# Patient Record
Sex: FEMALE | Race: WHITE | NOT HISPANIC OR LATINO | Employment: OTHER | URBAN - METROPOLITAN AREA
[De-identification: names, ages, dates, MRNs, and addresses within clinical notes are randomized per-mention and may not be internally consistent; named-entity substitution may affect disease eponyms.]

---

## 2017-01-03 ENCOUNTER — RECORDS - HEALTHEAST (OUTPATIENT)
Dept: GENERAL RADIOLOGY | Facility: CLINIC | Age: 57
End: 2017-01-03

## 2017-01-03 ENCOUNTER — OFFICE VISIT - HEALTHEAST (OUTPATIENT)
Dept: FAMILY MEDICINE | Facility: CLINIC | Age: 57
End: 2017-01-03

## 2017-01-03 DIAGNOSIS — R07.89 OTHER CHEST PAIN: ICD-10-CM

## 2017-01-03 DIAGNOSIS — M54.2 NECK PAIN: ICD-10-CM

## 2017-01-03 DIAGNOSIS — R07.89 CHEST WALL PAIN: ICD-10-CM

## 2017-01-27 ENCOUNTER — RECORDS - HEALTHEAST (OUTPATIENT)
Dept: ADMINISTRATIVE | Facility: OTHER | Age: 57
End: 2017-01-27

## 2017-12-19 ENCOUNTER — RECORDS - HEALTHEAST (OUTPATIENT)
Dept: ADMINISTRATIVE | Facility: OTHER | Age: 57
End: 2017-12-19

## 2018-02-08 ENCOUNTER — HOSPITAL ENCOUNTER (OUTPATIENT)
Dept: MAMMOGRAPHY | Facility: HOSPITAL | Age: 58
Discharge: HOME | End: 2018-02-08

## 2018-02-08 DIAGNOSIS — Z12.31 VISIT FOR SCREENING MAMMOGRAM: ICD-10-CM

## 2018-07-02 ENCOUNTER — COMMUNICATION - HEALTHEAST (OUTPATIENT)
Dept: FAMILY MEDICINE | Facility: CLINIC | Age: 58
End: 2018-07-02

## 2018-07-02 ENCOUNTER — OFFICE VISIT - HEALTHEAST (OUTPATIENT)
Dept: FAMILY MEDICINE | Facility: CLINIC | Age: 58
End: 2018-07-02

## 2018-07-02 DIAGNOSIS — R05.9 COUGH: ICD-10-CM

## 2018-07-02 DIAGNOSIS — Z12.11 SCREEN FOR COLON CANCER: ICD-10-CM

## 2018-07-02 ASSESSMENT — MIFFLIN-ST. JEOR: SCORE: 1614.89

## 2018-07-10 ENCOUNTER — COMMUNICATION - HEALTHEAST (OUTPATIENT)
Dept: SCHEDULING | Facility: CLINIC | Age: 58
End: 2018-07-10

## 2018-11-19 ENCOUNTER — RECORDS - HEALTHEAST (OUTPATIENT)
Dept: ADMINISTRATIVE | Facility: OTHER | Age: 58
End: 2018-11-19

## 2018-11-20 ENCOUNTER — RECORDS - HEALTHEAST (OUTPATIENT)
Dept: ADMINISTRATIVE | Facility: OTHER | Age: 58
End: 2018-11-20

## 2018-12-14 ENCOUNTER — OFFICE VISIT - HEALTHEAST (OUTPATIENT)
Dept: FAMILY MEDICINE | Facility: CLINIC | Age: 58
End: 2018-12-14

## 2018-12-14 ENCOUNTER — COMMUNICATION - HEALTHEAST (OUTPATIENT)
Dept: SCHEDULING | Facility: CLINIC | Age: 58
End: 2018-12-14

## 2018-12-14 DIAGNOSIS — Q63.9 KIDNEY ANOMALY, CONGENITAL: ICD-10-CM

## 2018-12-14 DIAGNOSIS — J01.00 ACUTE NON-RECURRENT MAXILLARY SINUSITIS: ICD-10-CM

## 2018-12-14 ASSESSMENT — MIFFLIN-ST. JEOR: SCORE: 1621.7

## 2019-08-07 ENCOUNTER — COMMUNICATION - HEALTHEAST (OUTPATIENT)
Dept: TELEHEALTH | Facility: CLINIC | Age: 59
End: 2019-08-07

## 2019-08-07 ENCOUNTER — COMMUNICATION - HEALTHEAST (OUTPATIENT)
Dept: FAMILY MEDICINE | Facility: CLINIC | Age: 59
End: 2019-08-07

## 2019-09-10 ENCOUNTER — COMMUNICATION - HEALTHEAST (OUTPATIENT)
Dept: FAMILY MEDICINE | Facility: CLINIC | Age: 59
End: 2019-09-10

## 2019-10-07 ENCOUNTER — HOSPITAL ENCOUNTER (OUTPATIENT)
Dept: MAMMOGRAPHY | Facility: CLINIC | Age: 59
Discharge: HOME | End: 2019-10-07
Attending: OBSTETRICS & GYNECOLOGY

## 2019-10-07 DIAGNOSIS — Z12.31 VISIT FOR SCREENING MAMMOGRAM: ICD-10-CM

## 2020-07-10 ENCOUNTER — COMMUNICATION - HEALTHEAST (OUTPATIENT)
Dept: FAMILY MEDICINE | Facility: CLINIC | Age: 60
End: 2020-07-10

## 2020-07-10 ENCOUNTER — OFFICE VISIT - HEALTHEAST (OUTPATIENT)
Dept: FAMILY MEDICINE | Facility: CLINIC | Age: 60
End: 2020-07-10

## 2020-07-10 ENCOUNTER — COMMUNICATION - HEALTHEAST (OUTPATIENT)
Dept: SCHEDULING | Facility: CLINIC | Age: 60
End: 2020-07-10

## 2020-07-10 DIAGNOSIS — R53.83 FATIGUE, UNSPECIFIED TYPE: ICD-10-CM

## 2020-07-10 DIAGNOSIS — R19.7 DIARRHEA, UNSPECIFIED TYPE: ICD-10-CM

## 2020-07-10 DIAGNOSIS — R05.9 COUGH: ICD-10-CM

## 2020-07-10 DIAGNOSIS — Z20.822 EXPOSURE TO COVID-19 VIRUS: ICD-10-CM

## 2020-07-10 DIAGNOSIS — M79.10 MYALGIA: ICD-10-CM

## 2020-07-11 ENCOUNTER — AMBULATORY - HEALTHEAST (OUTPATIENT)
Dept: FAMILY MEDICINE | Facility: CLINIC | Age: 60
End: 2020-07-11

## 2020-07-11 DIAGNOSIS — R05.9 COUGH: ICD-10-CM

## 2020-07-11 DIAGNOSIS — R19.7 DIARRHEA, UNSPECIFIED TYPE: ICD-10-CM

## 2020-07-11 DIAGNOSIS — Z20.822 EXPOSURE TO COVID-19 VIRUS: ICD-10-CM

## 2020-07-11 DIAGNOSIS — R53.83 FATIGUE, UNSPECIFIED TYPE: ICD-10-CM

## 2020-07-11 DIAGNOSIS — M79.10 MYALGIA: ICD-10-CM

## 2020-07-12 ENCOUNTER — COMMUNICATION - HEALTHEAST (OUTPATIENT)
Dept: LAB | Facility: CLINIC | Age: 60
End: 2020-07-12

## 2020-07-13 ENCOUNTER — COMMUNICATION - HEALTHEAST (OUTPATIENT)
Dept: FAMILY MEDICINE | Facility: CLINIC | Age: 60
End: 2020-07-13

## 2020-07-15 ENCOUNTER — COMMUNICATION - HEALTHEAST (OUTPATIENT)
Dept: FAMILY MEDICINE | Facility: CLINIC | Age: 60
End: 2020-07-15

## 2021-01-04 ENCOUNTER — HEALTH MAINTENANCE LETTER (OUTPATIENT)
Age: 61
End: 2021-01-04

## 2021-01-04 ENCOUNTER — HOSPITAL ENCOUNTER (OUTPATIENT)
Dept: MAMMOGRAPHY | Facility: CLINIC | Age: 61
Discharge: HOME | End: 2021-01-04
Attending: OBSTETRICS & GYNECOLOGY

## 2021-01-04 DIAGNOSIS — Z12.31 VISIT FOR SCREENING MAMMOGRAM: ICD-10-CM

## 2021-05-26 ENCOUNTER — RECORDS - HEALTHEAST (OUTPATIENT)
Dept: ADMINISTRATIVE | Facility: CLINIC | Age: 61
End: 2021-05-26

## 2021-05-27 ENCOUNTER — RECORDS - HEALTHEAST (OUTPATIENT)
Dept: ADMINISTRATIVE | Facility: CLINIC | Age: 61
End: 2021-05-27

## 2021-05-28 ENCOUNTER — RECORDS - HEALTHEAST (OUTPATIENT)
Dept: ADMINISTRATIVE | Facility: CLINIC | Age: 61
End: 2021-05-28

## 2021-05-29 ENCOUNTER — RECORDS - HEALTHEAST (OUTPATIENT)
Dept: ADMINISTRATIVE | Facility: CLINIC | Age: 61
End: 2021-05-29

## 2021-05-30 ENCOUNTER — RECORDS - HEALTHEAST (OUTPATIENT)
Dept: ADMINISTRATIVE | Facility: CLINIC | Age: 61
End: 2021-05-30

## 2021-05-30 VITALS — HEIGHT: 66 IN | BODY MASS INDEX: 34.7 KG/M2

## 2021-05-31 NOTE — TELEPHONE ENCOUNTER
Pt stopped into the clinic today with her mother.  She stated that her dad just passed away recently and her mother has moved up to [place] from [place].     Wondering if JM would take her mother on as a new pt. Moms name is   Rahat Hutson.     Thank you.

## 2021-06-01 VITALS — BODY MASS INDEX: 36.25 KG/M2 | HEIGHT: 66 IN | WEIGHT: 225.56 LBS

## 2021-06-02 VITALS — HEIGHT: 66 IN | WEIGHT: 227.06 LBS | BODY MASS INDEX: 36.49 KG/M2
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
## 2021-06-09 NOTE — TELEPHONE ENCOUNTER
She was exposed to her MD who tested positive for covid19 on Monday. She started with body aches, fatigue, nasal congestion yesterday. I connected with scheduling for a telephone appointment today @ 10:40 a.m. with Dr Muna Barajas to hopefully get an order for covid19 testing.  Yoselin Vega RN  [facility]      Reason for Disposition    [1] COVID-19 infection suspected by caller or triager AND [2] mild symptoms (cough, fever, or others) AND [3] no complications or SOB    Additional Information    Negative: SEVERE difficulty breathing (e.g., struggling for each breath, speaks in single words)    Negative: Difficult to awaken or acting confused (e.g., disoriented, slurred speech)    Negative: Bluish (or gray) lips or face now    Negative: Shock suspected (e.g., cold/pale/clammy skin, too weak to stand, low BP, rapid pulse)    Negative: Sounds like a life-threatening emergency to the triager    Negative: [1] COVID-19 exposure AND [2] no symptoms    Negative: COVID-19 and Breastfeeding, questions about    Negative: [1] Adult with possible COVID-19 symptoms AND [2] triager concerned about severity of symptoms or other causes    Negative: SEVERE or constant chest pain or pressure (Exception: mild central chest pain, present only when coughing)    Negative: MODERATE difficulty breathing (e.g., speaks in phrases, SOB even at rest, pulse 100-120)    Negative: Patient sounds very sick or weak to the triager    Negative: MILD difficulty breathing (e.g., minimal/no SOB at rest, SOB with walking, pulse <100)    Negative: Chest pain or pressure    Negative: Fever > 103 F (39.4 C)    Negative: [1] Fever > 101 F (38.3 C) AND [2] age > 60    Negative: [1] Fever > 100.0 F (37.8 C) AND [2] bedridden (e.g., nursing home patient, CVA, chronic illness, recovering from surgery)    Negative: HIGH RISK patient (e.g., age > 64 years, diabetes, heart or lung disease, weak immune system)    Negative: Fever present > 3 days (72  hours)    Negative: [1] Fever returns after gone for over 24 hours AND [2] symptoms worse or not improved    Negative: [1] Continuous (nonstop) coughing interferes with work or school AND [2] no improvement using cough treatment per protocol    Protocols used: CORONAVIRUS (COVID-19) DIAGNOSED OR [id number] 5.16.20

## 2021-06-09 NOTE — PROGRESS NOTES
"Inocencia Zafar is a 59 y.o. female who is being evaluated via a billable telephone visit.      The patient has been notified of following:     \"This telephone visit will be conducted via a call between you and your physician/provider. We have found that certain health care needs can be provided without the need for a physical exam.  This service lets us provide the care you need with a short phone conversation.  If a prescription is necessary we can send it directly to your pharmacy.  If lab work is needed we can place an order for that and you can then stop by our lab to have the test done at a later time.    Telephone visits are billed at different rates depending on your insurance coverage. During this emergency period, for some insurers they may be billed the same as an in-person visit.  Please reach out to your insurance provider with any questions.    If during the course of the call the physician/provider feels a telephone visit is not appropriate, you will not be charged for this service.\"    Patient has given verbal consent to a Telephone visit? Yes    What phone number would you like to be contacted at? [phone]    Patient would like to receive their AVS by AVS Preference: Mail a copy.    Chief Complaint   Patient presents with     General     onset 7/09/10, Pt was exposed to COVID by MD who tested positive for COVID on Monday)started with body aches, fatigue, nasal congestion, headache, temp 97.7( 7/10/10) starting yesterday. 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     Kristine got a call today that a doctor at the [facility] she had seen on Monday (today is Friday) tested positive for Covid19.  Both she and doctor had on a mask.  No one else in the office where the doctor worked tested positive.     AS of yesterday , maybe the evening before - woke up yesterday tired and achy.  She had not slept well the night before. Yesterday she was with her  all day at " [facility].  She was sore achy she had trouble walking at one point.  She also had a little coughing and sneezing yesterday, and today has a little headache and diarrhea    She denies fever, dyspnea, and loss of sense of smell    She lives with Elderly Mom, kids, ,. Mother has dementia and  has been with Kristine her the last year - just as of Wednesday they put her on an airplane to be with brother in [place].  She says very few people wee on the plane      Assessment/Plan:  1. Exposure to COVID-19 virus/ 2. Fatigue, unspecified type /3. Myalgia /4. Cough / 5. Diarrhea, unspecified type  Although this could be the combination of lack of sleep and another virus, given exposure we have to assume covid19 until proven otherwise.  Discussed self-isolation  - Symptomatic COVID-19 Virus (CORONAVIRUS) PCR; Future    I also ordered a test for her     Phone call duration: 30 minutes  10:55 - 11:25    Muna Barajas MD

## 2021-06-09 NOTE — PATIENT INSTRUCTIONS - HE
Instructions for Patients    Thank you for taking steps to prevent the spread of this virus.  o Limit your contact with others.  o Wear a simple mask to cover your cough.  o Wash your hands well and often.  o If you need medical care, go to [email or web address] or contact your health care provider.     For more about COVID-19 and caring for yourself at home, visit the CDC website at https://www.cdc.gov/coronavirus/2019-ncov/about/steps-when-sick.html.     To learn about care at [facility], please go to [email or web address].     Below are the COVID-19 hotlines at the [facility] ([facility]). Interpreters are available.     For health questions: Call [phone] or [phone] (7 a.m. to 7 p.m.)    For questions about schools and childcare: Call [phone] or [phone] (7 a.m. to 7 p.m.)

## 2021-06-09 NOTE — TELEPHONE ENCOUNTER
Left message to call back for: Patient  Information to relay to patient:  Please advise patient, Dr. Barajas CMA was trying to get in touch to go over some preliminary questions before pt telephone visit at 10:40 PM I will contact patient again closer to appt time.    BR, OMKAR

## 2021-06-09 NOTE — TELEPHONE ENCOUNTER
Spoke with pt and relayed results message below from Dr. Barajas.  Pt verbalized understanding and does not have any additional questions at this time.  Pt states that she is feeling a lot better and will call back if her symptoms return.

## 2021-06-09 NOTE — TELEPHONE ENCOUNTER
----- Message from Muna Barajas MD sent at 7/13/2020  8:12 AM CDT -----  Please call her and let her know Covid19 test is negative.  However if symptoms persist of worsen, she should consider retest.

## 2021-06-15 PROBLEM — R07.89 CHEST WALL PAIN: Status: ACTIVE | Noted: 2017-01-03

## 2021-06-19 NOTE — PROGRESS NOTES
Assessment/Plan:    Inocencia Zafar is a 57 y.o. female presenting for:    1. Screen for colon cancer  - Ambulatory referral for Colonoscopy    2. Cough  I personally reviewed the chest x-ray and find it to be negative for any pneumonia.  Patient does have wheezing on examination.  Although she does not have asthma I do believe she would benefit from an albuterol inhaler and a course of prednisone.  This was sent to the pharmacy.  Discussed signs and symptoms that would necessitate further follow-up.  Certainly if she begins to have fevers we would want to see her again.  I suspect that this is more irritation from allergens.  - XR Chest 2 Views  - predniSONE (DELTASONE) 20 MG tablet; Take 1 tablet (20 mg total) by mouth 2 (two) times a day for 5 days.  Dispense: 10 tablet; Refill: 0  - albuterol (PROAIR HFA;PROVENTIL HFA;VENTOLIN HFA) 90 mcg/actuation inhaler; Inhale 2 puffs every 6 (six) hours as needed for wheezing.  Dispense: 1 each; Refill: 0        There are no discontinued medications.        Chief Complaint:  Chief Complaint   Patient presents with     Nasal Congestion     Chest congestion- sxs x 3 days- seasonal allergies     Headache     Generalized Body Aches       Subjective:   Inocencia Zafar is a pleasant 57-year-old female with a past medical history significant for hypothyroidism presenting to the clinic today with concerns over chest congestion and shortness of breath.  Patient states that about 3 or 4 days ago she was sitting on her neighbors deck when there is a lot of dust and allergens.  She states it is that time she has been experiencing some shortness of breath and coughing.  She has had some nasal and chest congestion as well.  She has been taking Sudafed and Mucinex.  Tylenol on occasion.  She does not have a history of asthma nor does she have a history of wheezing.    She has been eating and drinking a bit less per her report but staying adequately hydrated.    12 point review of  "systems completed and negative except for what has been described above    History   Smoking Status     Never Smoker   Smokeless Tobacco     Never Used       Current Outpatient Prescriptions   Medication Sig Note     cholecalciferol, vitamin D3, (VITAMIN D3) 1,000 unit capsule Take by mouth daily. 2 capsules daily      coenzyme Q10 (CO Q-10) 100 mg capsule Take by mouth daily.      CYANOCOBALAMIN, VITAMIN B-12, (VITAMIN B-12 ORAL) Take by mouth.      levothyroxine (SYNTHROID, LEVOTHROID) 175 MCG tablet daily. Takes branded Synthroid 11/29/2016: Received from: [facility] Received Sig: Take 1 pill daily 6 days per week.  1/2 pill daily on one day a week.     MULTIVIT &MINERALS/FERROUS FUM (MULTI VITAMIN ORAL) Take by mouth daily.      OMEGA-3S/DHA/EPA/FISH OIL (OMEGA 3 ORAL) Take by mouth.      albuterol (PROAIR HFA;PROVENTIL HFA;VENTOLIN HFA) 90 mcg/actuation inhaler Inhale 2 puffs every 6 (six) hours as needed for wheezing.      predniSONE (DELTASONE) 20 MG tablet Take 1 tablet (20 mg total) by mouth 2 (two) times a day for 5 days.          Objective:  Vitals:    07/02/18 1357   BP: 122/88   Pulse: 94   Resp: 20   Temp: 98.8  F (37.1  C)   TempSrc: Oral   SpO2: 97%   Weight: (!) 225 lb 9 oz (102.3 kg)   Height: 5' 6\" (1.676 m)       Vital signs reviewed and stable  General: No acute distress  Psych: Appropriate affect  HEENT: moist mucous membranes, pupils equal, round, reactive to light and accomodation, posterior oropharynx clear of erythema or exudate, tympanic membranes are pearly grey bilaterally  Lymph: no cervical or supraclavicular lymphadenopathy  Cardiovascular: regular rate and rhythm with no murmur  Pulmonary: Diffuse end inspiratory and end expiratory wheezes bilaterally.  No rhonchi or crackles noted.  Abdomen: soft, non tender, non distended with normo-active bowel sounds  Extremities: warm and well perfused with no edema  Skin: warm and dry with no rash         This " note has been dictated and transcribed using voice recognition software.   Any errors in transcription are unintentional and inherent to the software.

## 2021-06-19 NOTE — LETTER
Letter by Hardeep Sharp MD at      Author: Hardeep Sharp MD Service: -- Author Type: --    Filed:  Encounter Date: 9/10/2019 Status: (Other)       Inocencia MICHAEL Zafar  [address] [address]    September 10, 2019    Dear Inocencia    In reviewing your records, we have determined a gap in your preventive services. Based on your age and health history, we recommend the follow service.     ? General Physical  ? Physical with a Pap Smear  ? Colon cancer screening  ? Mammogram  ? Immunization  ? Diabetic check  ? Blood pressure/cardiovascular check  ? Asthma check  ? Cholesterol test  ? Lab work  ? Med check    If you have had the service elsewhere, please contact us so we can update our records. Please let us know if you have transferred your care to another clinic.    Please call [phone] to schedule this appointment.    We believe that a strong preventive care program, including regular physicals and follow-up care is an important part of a healthy lifestyle and we are committed to helping you maintain your health.    Thank you for choosing us as your health care provider.    Sincerely,   [facility]  [address] [facility] [facility] [address]  Phone Number:  [phone]

## 2021-06-20 NOTE — LETTER
Letter by Judit Khoury RN at      Author: Judit Khoury RN Service: -- Author Type: --    Filed:  Encounter Date: 7/12/2020 Status: (Other)       7/12/2020        Inocencia Zafar  [address] [address]    This letter provides a written record that you were tested for COVID-19 on 7/11/20.     Your result was negative. This means that we didnt find the virus that causes COVID-19 in your sample. A test may show negative when you do actually have the virus. This can happen when the virus is in the early stages of infection, before you feel illness symptoms.    If you have symptoms   Stay home and away from others (self-isolate) until you meet ALL of the guidelines below:    Youve had no fever--and no medicine that reduces fever--for 3 full days (72 hours). And ?    Your other symptoms have gotten better. For example, your cough or breathing has improved. And?    At least 10 days have passed since your symptoms started.    During this time:    Stay home. Dont go to work, school or anywhere else.     Stay in your own room, including for meals. Use your own bathroom if you can.    Stay away from others in your home. No hugging, kissing or shaking hands. No visitors.    Clean high touch surfaces often (doorknobs, counters, handles, etc.). Use a household cleaning spray or wipes. You can find a full list on the EPA website at www.epa.gov/pesticide-registration/list-n-disinfectants-use-against-sars-cov-2.    Cover your mouth and nose with a mask, tissue or washcloth to avoid spreading germs.    Wash your hands and face often with soap and water.    Going back to work  Check with your employer for any guidelines to follow for going back to work.    Employers: This document serves as formal notice that your employee tested negative for COVID-19, as of the testing date shown above.

## 2021-06-20 NOTE — LETTER
Letter by Ashanti Edwards RN at      Author: Ashanti Edwards RN Service: -- Author Type: --    Filed:  Encounter Date: 7/15/2020 Status: (Other)       7/15/2020        Inocencia Zafar  [address] [address]    This letter provides a written record that you were tested for COVID-19 on 7/11/2020.     Your result was negative. This means that we didnt find the virus that causes COVID-19 in your sample. A test may show negative when you do actually have the virus. This can happen when the virus is in the early stages of infection, before you feel illness symptoms.    If you have symptoms   Stay home and away from others (self-isolate) until you meet ALL of the guidelines below:    Youve had no fever--and no medicine that reduces fever--for 3 full days (72 hours). And ?    Your other symptoms have gotten better. For example, your cough or breathing has improved. And?    At least 10 days have passed since your symptoms started.    During this time:    Stay home. Dont go to work, school or anywhere else.     Stay in your own room, including for meals. Use your own bathroom if you can.    Stay away from others in your home. No hugging, kissing or shaking hands. No visitors.    Clean high touch surfaces often (doorknobs, counters, handles, etc.). Use a household cleaning spray or wipes. You can find a full list on the EPA website at www.epa.gov/pesticide-registration/list-n-disinfectants-use-against-sars-cov-2.    Cover your mouth and nose with a mask, tissue or washcloth to avoid spreading germs.    Wash your hands and face often with soap and water.    Going back to work  Check with your employer for any guidelines to follow for going back to work.    Employers: This document serves as formal notice that your employee tested negative for COVID-19, as of the testing date shown above.

## 2021-06-22 NOTE — PROGRESS NOTES
Assessment/Plan:    Inocencia Zafar is a 58 y.o. female presenting for:    1. Acute non-recurrent maxillary sinusitis   the patient has symptoms for approximately 5 days at this point. I discussed with the patient but generally we do not treat sinus infections until daytime given that they are almost always viral infections. I did give her a prescription for Augmentin however discussed that I would prefer if she try some symptomatic measures we discussed and only take the  Antibiotic next week if necessary.    2. Kidney anomaly, congenital  Because the patient states that the anomaly is most likely due to an anomaly in her urinary tract I will send a referral to urology for evaluation. I did discuss it there is likely not much that they could do but it would be reasonable for her to see a specialist to see what kind of monitoring, if any, would be warranted  - Ambulatory referral to Urology        There are no discontinued medications.        Chief Complaint:  Chief Complaint   Patient presents with     Sinusitis     Sxs x 3 days- getting worse- headache, pressure, mucus, drainage, ear pain     Referral     Urologist       Subjective:   Inocencia Zafar  It is a pleasant 58-year-old female listening to the clinic today or two concerns:     1. Sinusitis: patient notes of the last five  Days she has had intense pressure in her face and  Yellow/green nasal drainage. She states that she gets a sinus infection once yearly. She feels as though it is getting worse. She has used a steam treatment to her face which is not seem to help. She has also tried ibuprofen, Sudafed and mucinex.   She has not had fevers. She is eating and drinking normally a report.     2.  Congenital single kidney: patient notes that she was found to have a single kidney that was functioning. It was thought that this was likely due to a abnormality in the urinary tract. This was diagnosed about 10 years ago and she states that she was supposed  "to be following up with urology but she has not seen them for at least eight years. She would like a referral at this point. She has no issues with urination. Recent creatinine was normal.        12 point review of systems completed and negative except for what has been described above    Social History     Tobacco Use   Smoking Status Never Smoker   Smokeless Tobacco Never Used       Current Outpatient Medications   Medication Sig Note     albuterol (PROAIR HFA;PROVENTIL HFA;VENTOLIN HFA) 90 mcg/actuation inhaler Inhale 2 puffs every 6 (six) hours as needed for wheezing.      cholecalciferol, vitamin D3, (VITAMIN D3) 1,000 unit capsule Take by mouth daily. 2 capsules daily      coenzyme Q10 (CO Q-10) 100 mg capsule Take by mouth daily.      CYANOCOBALAMIN, VITAMIN B-12, (VITAMIN B-12 ORAL) Take by mouth.      levothyroxine (SYNTHROID, LEVOTHROID) 175 MCG tablet daily. Takes branded Synthroid 11/29/2016: Received from: [facility] Received Sig: Take 1 pill daily 6 days per week.  1/2 pill daily on one day a week.     MULTIVIT &MINERALS/FERROUS FUM (MULTI VITAMIN ORAL) Take by mouth daily.      OMEGA-3S/DHA/EPA/FISH OIL (OMEGA 3 ORAL) Take by mouth.      amoxicillin-clavulanate (AUGMENTIN) 875-125 mg per tablet Take 1 tablet by mouth 2 (two) times a day for 10 days.          Objective:  Vitals:    12/14/18 1404   BP: 126/89   Pulse: 79   Resp: 16   Temp: 97.5  F (36.4  C)   TempSrc: Oral   Weight: (!) 227 lb 1 oz (103 kg)   Height: 5' 6\" (1.676 m)       Body mass index is 36.65 kg/m .    Vital signs reviewed and stable  General: No acute distress  Psych: Appropriate affect  HEENT: moist mucous membranes, pupils equal, round, reactive to light and accomodation, posterior oropharynx clear of erythema or exudate, tympanic membranes are pearly grey bilaterally  Lymph: no cervical or supraclavicular lymphadenopathy  Cardiovascular: regular rate and rhythm with no murmur  Pulmonary: clear " to auscultation bilaterally with no wheeze  Abdomen: soft, non tender, non distended with normo-active bowel sounds  Extremities: warm and well perfused with no edema  Skin: warm and dry with no rash         This note has been dictated and transcribed using voice recognition software.   Any errors in transcription are unintentional and inherent to the software.

## 2021-07-13 ENCOUNTER — RECORDS - HEALTHEAST (OUTPATIENT)
Dept: ADMINISTRATIVE | Facility: CLINIC | Age: 61
End: 2021-07-13

## 2021-07-21 ENCOUNTER — RECORDS - HEALTHEAST (OUTPATIENT)
Dept: ADMINISTRATIVE | Facility: CLINIC | Age: 61
End: 2021-07-21

## 2021-10-11 ENCOUNTER — HEALTH MAINTENANCE LETTER (OUTPATIENT)
Age: 61
End: 2021-10-11

## 2022-01-30 ENCOUNTER — HEALTH MAINTENANCE LETTER (OUTPATIENT)
Age: 62
End: 2022-01-30

## 2022-04-08 ENCOUNTER — ANCILLARY PROCEDURE (OUTPATIENT)
Dept: MAMMOGRAPHY | Facility: CLINIC | Age: 62
End: 2022-04-08
Attending: FAMILY MEDICINE
Payer: COMMERCIAL

## 2022-04-08 DIAGNOSIS — Z12.31 SCREENING MAMMOGRAM, ENCOUNTER FOR: ICD-10-CM

## 2022-04-08 PROCEDURE — 77067 SCR MAMMO BI INCL CAD: CPT

## 2022-09-24 ENCOUNTER — HEALTH MAINTENANCE LETTER (OUTPATIENT)
Age: 62
End: 2022-09-24

## 2022-10-12 ENCOUNTER — NURSE TRIAGE (OUTPATIENT)
Dept: NURSING | Facility: CLINIC | Age: 62
End: 2022-10-12

## 2022-10-12 ENCOUNTER — OFFICE VISIT (OUTPATIENT)
Dept: INTERNAL MEDICINE | Facility: CLINIC | Age: 62
End: 2022-10-12
Payer: COMMERCIAL

## 2022-10-12 VITALS
OXYGEN SATURATION: 98 % | BODY MASS INDEX: 34.38 KG/M2 | DIASTOLIC BLOOD PRESSURE: 80 MMHG | HEART RATE: 82 BPM | TEMPERATURE: 98.1 F | WEIGHT: 213 LBS | SYSTOLIC BLOOD PRESSURE: 134 MMHG

## 2022-10-12 DIAGNOSIS — J01.90 ACUTE SINUSITIS, RECURRENCE NOT SPECIFIED, UNSPECIFIED LOCATION: Primary | ICD-10-CM

## 2022-10-12 PROCEDURE — 99213 OFFICE O/P EST LOW 20 MIN: CPT | Performed by: NURSE PRACTITIONER

## 2022-10-12 RX ORDER — AMOXICILLIN 875 MG
875 TABLET ORAL 2 TIMES DAILY
Qty: 14 TABLET | Refills: 0 | Status: SHIPPED | OUTPATIENT
Start: 2022-10-12 | End: 2023-06-06

## 2022-10-12 RX ORDER — PREDNISONE 20 MG/1
20 TABLET ORAL DAILY
Qty: 5 TABLET | Refills: 0 | Status: SHIPPED | OUTPATIENT
Start: 2022-10-12 | End: 2023-06-06

## 2022-10-12 NOTE — TELEPHONE ENCOUNTER
Patient is calling and states that she has allergies that has gone down to her chest.  Now congestion in chest.  Cough is present, nasal drainage, coughing up yellow mucus.  Patient has tried antihistamines and decongestant.  Symptoms started three days ago.  Patient denies fever and denies shortness of breath.  Patient states that she has allergies and feels it is a mixture of allergies and sinusitis.  Patient denies severe headache.  Denies redness or swelling on the cheek, forehead, or around the eye.  Patient states that she is going to [facility].      Reason for Disposition    Patient wants to be seen    Additional Information    Negative: Sounds like a life-threatening emergency to the triager    Negative: Difficulty breathing, and not from stuffy nose (e.g., not relieved by cleaning out the nose)    Negative: SEVERE headache and has fever    Negative: Patient sounds very sick or weak to the triager    Negative: SEVERE sinus pain    Negative: Severe headache    Negative: Redness or swelling on the cheek, forehead, or around the eye    Negative: Fever > 103 F (39.4 C)    Negative: Fever > 101 F (38.3 C) and over 60 years of age    Negative: Fever > 100.0 F (37.8 C) and bedridden (e.g., nursing home patient, stroke, chronic illness, recovering from surgery)    Negative: Fever > 100.0 F (37.8 C) and has diabetes mellitus or a weak immune system (e.g., HIV positive, cancer chemotherapy, organ transplant, splenectomy, chronic steroids)    Negative: Fever present > 3 days (72 hours)    Negative: Fever returns after gone for over 24 hours and symptoms worse or not improved    Negative: Sinus pain (not just congestion) and fever    Negative: Earache    Negative: Sinus congestion (pressure, fullness) present > 10 days    Negative: Nasal discharge present > 10 days    Negative: Using nasal washes and pain medicine > 24 hours and sinus pain (lower forehead, cheekbone, or eye) persists    Negative: Lots of  coughing    Protocols used: SINUS PAIN OR CONGESTION-A-OH

## 2023-05-08 ENCOUNTER — HEALTH MAINTENANCE LETTER (OUTPATIENT)
Age: 63
End: 2023-05-08

## 2023-06-06 ENCOUNTER — TELEPHONE (OUTPATIENT)
Dept: FAMILY MEDICINE | Facility: CLINIC | Age: 63
End: 2023-06-06

## 2023-06-06 ENCOUNTER — OFFICE VISIT (OUTPATIENT)
Dept: FAMILY MEDICINE | Facility: CLINIC | Age: 63
End: 2023-06-06
Payer: COMMERCIAL

## 2023-06-06 VITALS
WEIGHT: 219 LBS | HEART RATE: 66 BPM | SYSTOLIC BLOOD PRESSURE: 129 MMHG | RESPIRATION RATE: 16 BRPM | OXYGEN SATURATION: 98 % | BODY MASS INDEX: 35.2 KG/M2 | TEMPERATURE: 97.9 F | DIASTOLIC BLOOD PRESSURE: 88 MMHG | HEIGHT: 66 IN

## 2023-06-06 DIAGNOSIS — Z12.4 CERVICAL CANCER SCREENING: ICD-10-CM

## 2023-06-06 DIAGNOSIS — Z00.00 ROUTINE PHYSICAL EXAMINATION: Primary | ICD-10-CM

## 2023-06-06 DIAGNOSIS — L85.3 DRY SKIN: ICD-10-CM

## 2023-06-06 DIAGNOSIS — H40.9 GLAUCOMA, UNSPECIFIED GLAUCOMA TYPE, UNSPECIFIED LATERALITY: ICD-10-CM

## 2023-06-06 DIAGNOSIS — E78.00 PURE HYPERCHOLESTEROLEMIA: ICD-10-CM

## 2023-06-06 DIAGNOSIS — E03.9 HYPOTHYROIDISM, UNSPECIFIED TYPE: ICD-10-CM

## 2023-06-06 LAB
ALBUMIN UR-MCNC: 100 MG/DL
APPEARANCE UR: CLEAR
BACTERIA #/AREA URNS HPF: ABNORMAL /HPF
BILIRUB UR QL STRIP: NEGATIVE
COLOR UR AUTO: YELLOW
GLUCOSE UR STRIP-MCNC: NEGATIVE MG/DL
HBA1C MFR BLD: 5.7 % (ref 0–5.6)
HGB UR QL STRIP: NEGATIVE
KETONES UR STRIP-MCNC: NEGATIVE MG/DL
LEUKOCYTE ESTERASE UR QL STRIP: NEGATIVE
NITRATE UR QL: NEGATIVE
PH UR STRIP: 5.5 [PH] (ref 5–8)
RBC #/AREA URNS AUTO: ABNORMAL /HPF
SP GR UR STRIP: 1.01 (ref 1–1.03)
SQUAMOUS #/AREA URNS AUTO: ABNORMAL /LPF
UROBILINOGEN UR STRIP-ACNC: 0.2 E.U./DL
WBC #/AREA URNS AUTO: ABNORMAL /HPF

## 2023-06-06 PROCEDURE — 36415 COLL VENOUS BLD VENIPUNCTURE: CPT | Performed by: FAMILY MEDICINE

## 2023-06-06 PROCEDURE — 99396 PREV VISIT EST AGE 40-64: CPT | Performed by: FAMILY MEDICINE

## 2023-06-06 PROCEDURE — 99213 OFFICE O/P EST LOW 20 MIN: CPT | Mod: 25 | Performed by: FAMILY MEDICINE

## 2023-06-06 PROCEDURE — 81001 URINALYSIS AUTO W/SCOPE: CPT | Performed by: FAMILY MEDICINE

## 2023-06-06 PROCEDURE — 83036 HEMOGLOBIN GLYCOSYLATED A1C: CPT | Performed by: FAMILY MEDICINE

## 2023-06-06 PROCEDURE — 80048 BASIC METABOLIC PNL TOTAL CA: CPT | Performed by: FAMILY MEDICINE

## 2023-06-06 RX ORDER — AMMONIUM LACTATE 12 G/100G
CREAM TOPICAL
Qty: 385 G | Refills: 1 | Status: SHIPPED | OUTPATIENT
Start: 2023-06-06

## 2023-06-06 RX ORDER — BETAMETHASONE DIPROPIONATE 0.5 MG/G
LOTION TOPICAL
Qty: 60 ML | Refills: 1 | Status: SHIPPED | OUTPATIENT
Start: 2023-06-06 | End: 2024-09-25

## 2023-06-06 RX ORDER — BETAMETHASONE DIPROPIONATE 0.5 MG/G
CREAM TOPICAL
Qty: 45 G | Refills: 1 | Status: SHIPPED | OUTPATIENT
Start: 2023-06-06 | End: 2023-08-02

## 2023-06-06 RX ORDER — VALACYCLOVIR HYDROCHLORIDE 1 G/1
1000 TABLET, FILM COATED ORAL 2 TIMES DAILY
COMMUNITY
Start: 2022-07-26 | End: 2023-06-06

## 2023-06-06 RX ORDER — LATANOPROST 50 UG/ML
SOLUTION/ DROPS OPHTHALMIC
COMMUNITY
Start: 2023-04-10

## 2023-06-06 RX ORDER — AMMONIUM LACTATE 12 G/100G
CREAM TOPICAL 2 TIMES DAILY
Qty: 385 G | Refills: 1 | Status: SHIPPED | OUTPATIENT
Start: 2023-06-06 | End: 2023-06-06

## 2023-06-06 RX ORDER — BETAMETHASONE DIPROPIONATE 0.5 MG/G
LOTION TOPICAL
Qty: 60 ML | Refills: 1 | Status: SHIPPED | OUTPATIENT
Start: 2023-06-06 | End: 2023-06-06

## 2023-06-06 RX ORDER — TRIAMCINOLONE ACETONIDE 1 MG/G
CREAM TOPICAL
COMMUNITY
Start: 2022-07-20

## 2023-06-06 ASSESSMENT — ENCOUNTER SYMPTOMS
HEMATURIA: 0
FREQUENCY: 0
ARTHRALGIAS: 0
DYSURIA: 0
HEMATOCHEZIA: 0
SORE THROAT: 0
CONSTIPATION: 0
ABDOMINAL PAIN: 0
WEAKNESS: 0
HEADACHES: 0
BREAST MASS: 0
HEARTBURN: 0
SHORTNESS OF BREATH: 0
NAUSEA: 0
JOINT SWELLING: 0
DIZZINESS: 0
PARESTHESIAS: 0
FEVER: 0
PALPITATIONS: 0
COUGH: 0
NERVOUS/ANXIOUS: 0
MYALGIAS: 0
EYE PAIN: 0
CHILLS: 0
DIARRHEA: 0

## 2023-06-06 NOTE — TELEPHONE ENCOUNTER
[facility] pharmacy is calling asking is there a specific number of times per day as needed for the betamethasone dipropionate 0.05% external lotion and for the ammonium lactate 12% external cream they need either a # of days supply or areas of application in order to estimate days supply for billing purposes. Please advise.

## 2023-06-06 NOTE — PATIENT INSTRUCTIONS
Inocencia,    We will check your kidney profile, urine test, and blood sugar testing today  You can reach out to Dr. Pryor regarding Ozempic  Dr. Jazmine Bailey is certified in weight loss management.  You may make an intake appointment with her  I sent refills of your steroid creams and lotions as requested  Follow-up to update your Pap test  Your colonoscopy is due later this year  You may set up a mammogram  You can also consider the Shingrix/shingles vaccine    Hardeep Sharp MD

## 2023-06-06 NOTE — PROGRESS NOTES
SUBJECTIVE:   CC: Inocencia is an 62 year old who presents for preventive health visit.     Inocencia is a pleasant 62-year-old female who presents to clinic for a preventive examination.    Medical history is notable for hypercholesterolemia, vitamin D deficiency, and hypothyroidism.  She follows up with ophthalmology given history of glaucoma.    In the past her total cholesterol was 261 with an LDL of 175.    She had a colonoscopy in November 2018 showing an adenomatous colon polyp.  She will be due in November 2023.    She has a history of dry skin and dry patches secondary to eczema.  She would like a refill of her steroid creams that she requires to treat this.    She does have some concern regarding her weight as well which has increased over time.  She has lost weight from a high of 239 pounds but struggles with losing weight.    She does follow-up with endocrinology given her history of hypothyroidism.          6/6/2023     3:33 PM   Additional Questions   Roomed by Svetlana ROBERTO CMA     Healthy Habits:     Getting at least 3 servings of Calcium per day:  Yes    Bi-annual eye exam:  Yes    Dental care twice a year:  Yes    Sleep apnea or symptoms of sleep apnea:  None    Diet:  Other    Frequency of exercise:  4-5 days/week    Duration of exercise:  30-45 minutes    Taking medications regularly:  Yes    Medication side effects:  None    PHQ-2 Total Score: 0    Additional concerns today:  No            Today's PHQ-2 Score:       6/6/2023     3:26 PM   PHQ-2 ( 1999 [employer])   Q1: Little interest or pleasure in doing things 0   Q2: Feeling down, depressed or hopeless 0   PHQ-2 Score 0   Q1: Little interest or pleasure in doing things Not at all   Q2: Feeling down, depressed or hopeless Not at all   PHQ-2 Score 0       Have you ever done Advance Care Planning? (For example, a Health Directive, POLST, or a discussion with a medical provider or your loved ones about your wishes): Yes, patient states has an Advance Care  Planning document and will bring a copy to the clinic.    Social History     Tobacco Use     Smoking status: Never     Smokeless tobacco: Never   Vaping Use     Vaping status: Never Used   Substance Use Topics     Alcohol use: Not on file             6/6/2023     3:26 PM   Alcohol Use   Prescreen: >3 drinks/day or >7 drinks/week? No          View : No data to display.              Reviewed orders with patient.  Reviewed health maintenance and updated orders accordingly - Yes  Patient Active Problem List   Diagnosis     Obesity     Hypothyroidism     Vitamin D Deficiency     Essential Hypercholesterolemia     Lower Back Pain     Plantar Fasciitis     Chest wall pain     Past Surgical History:   Procedure Laterality Date     HYSTEROSCOPY, ABLATE ENDOMETRIUM HYDROTHERMAL, COMBINED      Description: Hysteroscopy With Endometrial Ablation;  Recorded: 01/26/2012;     LEFT OOPHORECTOMY Left      OTHER SURGICAL HISTORY      tonsllectomy     SALPINGECTOMY Left        Social History     Tobacco Use     Smoking status: Never     Smokeless tobacco: Never   Vaping Use     Vaping status: Never Used   Substance Use Topics     Alcohol use: Not on file     Family History   Problem Relation Age of Onset     Colon Cancer Mother      Dementia Mother      Colon Cancer Maternal Grandmother      Breast Cancer No family hx of          Current Outpatient Medications   Medication Sig Dispense Refill     ammonium lactate (AMLACTIN) 12 % external cream Apply topically to affected area of trunk and extremities BID prn 385 g 1     betamethasone dipropionate (DIPROSONE) 0.05 % external cream Apply to affected area BID prn 45 g 1     betamethasone dipropionate (DIPROSONE) 0.05 % external lotion Apply to affected area of scalp Qday prn 60 mL 1     cholecalciferol, vitamin D3, (VITAMIN D3) 1,000 unit capsule [CHOLECALCIFEROL, VITAMIN D3, (VITAMIN D3) 1,000 UNIT CAPSULE] Take by mouth daily. 2 capsules daily       latanoprost (XALATAN) 0.005 %  ophthalmic solution Apply 1 drop INTO Both Eyes once a daY       levothyroxine (SYNTHROID, LEVOTHROID) 175 MCG tablet [LEVOTHYROXINE (SYNTHROID, LEVOTHROID) 175 MCG TABLET] daily. Takes branded Synthroid       MULTIVIT &MINERALS/FERROUS FUM (MULTI VITAMIN ORAL) [MULTIVIT &MINERALS/FERROUS FUM (MULTI VITAMIN ORAL)] Take by mouth daily.       triamcinolone (KENALOG) 0.1 % external cream MIX 11 WITH KETOCONAZOLE.APPLY TO AFFECTED AREA TWICE DAILY FOR 2 WEEKS.       No Known Allergies    Breast Cancer Screening:    FHS-7:       4/8/2022     2:12 PM 6/6/2023     3:27 PM   Breast CA Risk Assessment (FHS-7)   Did any of your first-degree relatives have breast or ovarian cancer? No No   Did any of your relatives have bilateral breast cancer? No No   Did any man in your family have breast cancer? No No   Did any woman in your family have breast and ovarian cancer? No No   Did any woman in your family have breast cancer before age 50 y? No No   Do you have 2 or more relatives with breast and/or ovarian cancer? No No   Do you have 2 or more relatives with breast and/or bowel cancer? Yes No     click delete button to remove this line now  Mammogram Screening: Recommended mammography every 1-2 years with patient discussion and risk factor consideration  Pertinent mammograms are reviewed under the imaging tab.    History of abnormal Pap smear: NO - age 30-65 PAP every 5 years with negative HPV co-testing recommended     Reviewed and updated as needed this visit by clinical staff   Tobacco  Allergies  Meds              Reviewed and updated as needed this visit by Provider                 History reviewed. No pertinent past medical history.   Past Surgical History:   Procedure Laterality Date     HYSTEROSCOPY, ABLATE ENDOMETRIUM HYDROTHERMAL, COMBINED      Description: Hysteroscopy With Endometrial Ablation;  Recorded: 01/26/2012;     LEFT OOPHORECTOMY Left      OTHER SURGICAL HISTORY      tonsllectomy     SALPINGECTOMY Left   "      Review of Systems   Constitutional: Negative for chills and fever.   HENT: Negative for congestion, ear pain, hearing loss and sore throat.    Eyes: Negative for pain and visual disturbance.   Respiratory: Negative for cough and shortness of breath.    Cardiovascular: Negative for chest pain, palpitations and peripheral edema.   Gastrointestinal: Negative for abdominal pain, constipation, diarrhea, heartburn, hematochezia and nausea.   Breasts:  Negative for tenderness, breast mass and discharge.   Genitourinary: Negative for dysuria, frequency, genital sores, hematuria, pelvic pain, urgency, vaginal bleeding and vaginal discharge.   Musculoskeletal: Negative for arthralgias, joint swelling and myalgias.   Skin: Negative for rash.   Neurological: Negative for dizziness, weakness, headaches and paresthesias.   Psychiatric/Behavioral: Negative for mood changes. The patient is not nervous/anxious.      CONSTITUTIONAL: NEGATIVE for fever, chills, change in weight  INTEGUMENTARY/SKIN: NEGATIVE for worrisome rashes, moles or lesions  EYES: NEGATIVE for vision changes or irritation  ENT: NEGATIVE for ear, mouth and throat problems  RESP: NEGATIVE for significant cough or SOB  CV: NEGATIVE for chest pain, palpitations or peripheral edema  GI: NEGATIVE for nausea, abdominal pain, heartburn, or change in bowel habits  : NEGATIVE for unusual urinary or vaginal symptoms. No vaginal bleeding.  MUSCULOSKELETAL: NEGATIVE for significant arthralgias or myalgia  NEURO: NEGATIVE for weakness, dizziness or paresthesias  PSYCHIATRIC: NEGATIVE for changes in mood or affect      OBJECTIVE:   /88 (BP Location: Left arm, Patient Position: Sitting, Cuff Size: Adult Large)   Pulse 66   Temp 97.9  F (36.6  C) (Oral)   Resp 16   Ht 1.676 m (5' 6\")   Wt 99.3 kg (219 lb)   LMP  (LMP Unknown)   SpO2 98%   BMI 35.35 kg/m    Physical Exam      Diagnostic Test Results:  Labs reviewed in Epic    ASSESSMENT/PLAN:   Inocencia was " seen today for physical.    Diagnoses and all orders for this visit:    Routine physical examination    Recommend mammogram  Colonoscopy is up-to-date from August 2018.  Given a history of an adenomatous polyp she is due in November of 2023    Recommend increasing physical activity as she is able  Continue work on weight loss  She will discuss the possible use of Ozempic with endocrinology  She can consider follow-up with the weight loss specialist to review options as well  Recommend adequate calcium and vitamin D    Pure hypercholesterolemia    Check a lipid cascade  Calculate the 10-year cardiovascular risk    -     Hemoglobin A1c; Future  -     Hemoglobin A1c    Hypothyroidism, unspecified type    Continue follow-up endocrinology  Continue levothyroxine    Cervical cancer screening    Recommend follow-up with her gynecologist    -     Basic metabolic panel; Future  -     UA Macroscopic with reflex to Microscopic and Culture; Future  -     Basic metabolic panel  -     UA Macroscopic with reflex to Microscopic and Culture  -     UA Microscopic with Reflex to Culture    Dry skin  Eczema    Refilled multiple creams per her request  Recommend using topical steroid sparingly at the sneezing cause thinning of the skin if used on a prolonged basis  She will follow-up with dermatology as needed    -     betamethasone dipropionate (DIPROSONE) 0.05 % external cream; Apply to affected area BID prn  -     Discontinue: betamethasone dipropionate (DIPROSONE) 0.05 % external lotion; Apply to affected area of scalp prn  -     Discontinue: ammonium lactate (AMLACTIN) 12 % external cream; Apply topically 2 times daily  -     ammonium lactate (AMLACTIN) 12 % external cream; Apply topically to affected area of trunk and extremities BID prn  -     betamethasone dipropionate (DIPROSONE) 0.05 % external lotion; Apply to affected area of scalp Qday prn    Glaucoma, unspecified glaucoma type, unspecified laterality    Continue to  "follow-up with ophthalmology    Patient has been advised of split billing requirements and indicates understanding: Yes      COUNSELING:  Reviewed preventive health counseling, as reflected in patient instructions       Healthy diet/nutrition       Hearing screening       Alcohol Use       Colorectal Cancer Screening      BMI:   Estimated body mass index is 35.35 kg/m  as calculated from the following:    Height as of this encounter: 1.676 m (5' 6\").    Weight as of this encounter: 99.3 kg (219 lb).   Weight management plan: Discussed healthy diet and exercise guidelines      She reports that she has never smoked. She has never used smokeless tobacco.          Hardeep Sharp MD  [facility]  "

## 2023-06-07 LAB
ANION GAP SERPL CALCULATED.3IONS-SCNC: 12 MMOL/L (ref 7–15)
BUN SERPL-MCNC: 15.7 MG/DL (ref 8–23)
CALCIUM SERPL-MCNC: 9.4 MG/DL (ref 8.8–10.2)
CHLORIDE SERPL-SCNC: 104 MMOL/L (ref 98–107)
CREAT SERPL-MCNC: 0.98 MG/DL (ref 0.51–0.95)
DEPRECATED HCO3 PLAS-SCNC: 23 MMOL/L (ref 22–29)
GFR SERPL CREATININE-BSD FRML MDRD: 65 ML/MIN/1.73M2
GLUCOSE SERPL-MCNC: 99 MG/DL (ref 70–99)
POTASSIUM SERPL-SCNC: 4.1 MMOL/L (ref 3.4–5.3)
SODIUM SERPL-SCNC: 139 MMOL/L (ref 136–145)

## 2023-06-12 ENCOUNTER — TELEPHONE (OUTPATIENT)
Dept: FAMILY MEDICINE | Facility: CLINIC | Age: 63
End: 2023-06-12
Payer: COMMERCIAL

## 2023-06-12 DIAGNOSIS — R79.89 ELEVATED SERUM CREATININE: Primary | ICD-10-CM

## 2023-06-12 DIAGNOSIS — R73.03 PREDIABETES: ICD-10-CM

## 2023-06-12 NOTE — TELEPHONE ENCOUNTER
Pt requesting a call back or communication regarding abnormal kidney labs, would like to know what are next steps

## 2023-06-12 NOTE — TELEPHONE ENCOUNTER
Incoming call from patient: states that pharmacy no longer has the prescription for the betamethasone dipropionate (DIPROSONE) 0.05 % external cream, seems like the pharmacy is confused still. She was able to get the prescription for the lotion, for her scalp but bot the cream for her skin.  Please resend prescription for the cream

## 2023-06-13 NOTE — TELEPHONE ENCOUNTER
Incoming call from pt, stated that she missed a call from the clinic today at about 10:30am, assuming it was a call back regarding lab results. I do not see any calls made to patient today or messages documented. Pt requesting a call back, she would like to talk to care team regarding lab results

## 2023-06-29 NOTE — TELEPHONE ENCOUNTER
I called and reviewed. Her creatinine was mildly elevated. There was some protein in the urine as well. Recommend increased fluids and recommend minimizing NSAIDS as well. Reviewed her prediabetes.     She notes that she has a history of previous kidney issues (possible atrophic kidney?). We reviewed. I recommend rechecking the labs as discussed. If abnormal I recommend a renal ultrasound and follow-up with nephrology.     Patient agrees.

## 2023-08-02 DIAGNOSIS — L85.3 DRY SKIN: ICD-10-CM

## 2023-08-02 RX ORDER — BETAMETHASONE DIPROPIONATE 0.5 MG/G
CREAM TOPICAL
Qty: 45 G | Refills: 1 | Status: SHIPPED | OUTPATIENT
Start: 2023-08-02

## 2023-08-02 NOTE — TELEPHONE ENCOUNTER
Routing refill request to provider for review/approval because:  Drug not on the [facility] refill protocol     Last Written Prescription Date: 6/6/23  Last Fill Quantity: 45 g, # refills: 1  Last office visit provider: 6/6/23 Aron    Requested Prescriptions   Pending Prescriptions Disp Refills    betamethasone dipropionate (DIPROSONE) 0.05 % external cream 45 g 1     Sig: Apply to affected area BID prn       There is no refill protocol information for this order          Danni Montiel RN 08/02/23 9:51 AM

## 2023-08-02 NOTE — TELEPHONE ENCOUNTER
Please send in refill.   I did contact her pharmacy and they told me they do not have the CREAM on file for her.

## 2023-08-02 NOTE — TELEPHONE ENCOUNTER
Spoke with PCP in clinic regarding issue with Betamethasone cream Rx. PCP sent new Rx over to pharmacy for patient.    Called patient back to inform her that a new Rx was sent over for the cream. Patient verbalizes understanding & has no further questions.    SHANA BrumfieldN, RN  [facility]

## 2023-09-07 ENCOUNTER — TRANSFERRED RECORDS (OUTPATIENT)
Dept: HEALTH INFORMATION MANAGEMENT | Facility: CLINIC | Age: 63
End: 2023-09-07

## 2023-09-07 ENCOUNTER — ANCILLARY PROCEDURE (OUTPATIENT)
Dept: MAMMOGRAPHY | Facility: CLINIC | Age: 63
End: 2023-09-07
Attending: FAMILY MEDICINE
Payer: COMMERCIAL

## 2023-09-07 DIAGNOSIS — Z12.31 VISIT FOR SCREENING MAMMOGRAM: ICD-10-CM

## 2023-09-07 PROCEDURE — 77067 SCR MAMMO BI INCL CAD: CPT

## 2023-09-11 ENCOUNTER — LAB REQUISITION (OUTPATIENT)
Dept: LAB | Facility: CLINIC | Age: 63
End: 2023-09-11

## 2023-09-11 DIAGNOSIS — Z12.4 ENCOUNTER FOR SCREENING FOR MALIGNANT NEOPLASM OF CERVIX: ICD-10-CM

## 2023-09-11 PROCEDURE — G0145 SCR C/V CYTO,THINLAYER,RESCR: HCPCS | Performed by: OBSTETRICS & GYNECOLOGY

## 2023-09-11 PROCEDURE — 87624 HPV HI-RISK TYP POOLED RSLT: CPT | Performed by: OBSTETRICS & GYNECOLOGY

## 2023-09-14 LAB
BKR LAB AP GYN ADEQUACY: NORMAL
BKR LAB AP GYN INTERPRETATION: NORMAL
BKR LAB AP HPV REFLEX: NORMAL
BKR LAB AP LMP: NORMAL
BKR LAB AP PREVIOUS ABNL DX: NORMAL
BKR LAB AP PREVIOUS ABNORMAL: NORMAL
PATH REPORT.COMMENTS IMP SPEC: NORMAL
PATH REPORT.COMMENTS IMP SPEC: NORMAL
PATH REPORT.RELEVANT HX SPEC: NORMAL

## 2023-09-16 LAB
HUMAN PAPILLOMA VIRUS 16 DNA: NEGATIVE
HUMAN PAPILLOMA VIRUS 18 DNA: NEGATIVE
HUMAN PAPILLOMA VIRUS FINAL DIAGNOSIS: NORMAL
HUMAN PAPILLOMA VIRUS OTHER HR: NEGATIVE

## 2023-10-26 ENCOUNTER — OFFICE VISIT (OUTPATIENT)
Dept: INTERNAL MEDICINE | Facility: CLINIC | Age: 63
End: 2023-10-26
Payer: COMMERCIAL

## 2023-10-26 VITALS
BODY MASS INDEX: 35.36 KG/M2 | DIASTOLIC BLOOD PRESSURE: 90 MMHG | RESPIRATION RATE: 20 BRPM | SYSTOLIC BLOOD PRESSURE: 128 MMHG | HEIGHT: 66 IN | HEART RATE: 83 BPM | OXYGEN SATURATION: 99 % | WEIGHT: 220 LBS | TEMPERATURE: 98.1 F

## 2023-10-26 DIAGNOSIS — J32.9 RHINOSINUSITIS: Primary | ICD-10-CM

## 2023-10-26 DIAGNOSIS — R03.0 ELEVATED BLOOD PRESSURE READING WITHOUT DIAGNOSIS OF HYPERTENSION: ICD-10-CM

## 2023-10-26 PROCEDURE — 90480 ADMN SARSCOV2 VAC 1/ONLY CMP: CPT | Performed by: NURSE PRACTITIONER

## 2023-10-26 PROCEDURE — 99213 OFFICE O/P EST LOW 20 MIN: CPT | Performed by: NURSE PRACTITIONER

## 2023-10-26 PROCEDURE — 91320 SARSCV2 VAC 30MCG TRS-SUC IM: CPT | Performed by: NURSE PRACTITIONER

## 2023-10-26 ASSESSMENT — ENCOUNTER SYMPTOMS: COUGH: 1

## 2023-10-26 ASSESSMENT — PAIN SCALES - GENERAL: PAINLEVEL: NO PAIN (0)

## 2023-10-26 NOTE — ASSESSMENT & PLAN NOTE
Blood pressure is elevated today, however she has been taking an over-the-counter decongestant and is ill today.  She will follow-up with her primary care provider in December which will be a 6-month follow-up from her last visit.  Reevaluate blood pressure at this time.

## 2023-10-26 NOTE — PROGRESS NOTES
"  Assessment & Plan   Problem List Items Addressed This Visit       Elevated blood pressure reading without diagnosis of hypertension     Blood pressure is elevated today, however she has been taking an over-the-counter decongestant and is ill today.  She will follow-up with her primary care provider in December which will be a 6-month follow-up from her last visit.  Reevaluate blood pressure at this time.          Other Visit Diagnoses       Rhinosinusitis    -  Primary    Relevant Medications    amoxicillin-clavulanate (AUGMENTIN) 875-125 MG tablet           Start treatment for bacterial rhinosinusitis with Augmentin 1 tablet twice daily x7 days.  Encouraged her to take the entire course of the antibiotic.  Take with food.  Continue supportive cares including guaifenesin mucolytic and increased hydration.  Also encouraged saline rinses.  Over-the-counter pain relievers may be utilized as needed.  Follow-up clinic visit if symptoms worsen or fail to improve.         Jackie Ramon NP  [facility] ROCJEIMY Pro is a 63 year old, presenting for the following health issues:  Cough (Pt states that she has deep \"chest coughs,\" however pt states that she hasn't experienced any uncontrolled coughing fits.), Sinus Problem, and Imm/Inj (Pt was wondering if she can get the shot with her symptoms)        10/26/2023    10:45 AM   Additional Questions   Roomed by Donovan GRESHAM   Accompanied by N/A       History of Present Illness       Reason for visit:  Chest sinus congestion  Symptom onset:  3-7 days ago  Symptoms include:  Chest congestion sinus congestion    She eats 2-3 servings of fruits and vegetables daily.She consumes 0 sweetened beverage(s) daily.She exercises with enough effort to increase her heart rate 10 to 19 minutes per day.  She exercises with enough effort to increase her heart rate 3 or less days per week.   She is taking medications regularly.     She has had coughing, a burning in " "her chest, sinus pressure. She has been taking a decongestant, sounds like a pseudoephedrine. She has a \"rattling\" cough, not impairing her sleep. She feels a fullness in the back of the fever. COVID tests x 2 were negative. No dyspnea.       Review of Systems   Respiratory:  Positive for cough.             Objective    BP (!) 128/90   Pulse 83   Temp 98.1  F (36.7  C) (Oral)   Resp 20   Ht 1.676 m (5' 6\")   Wt 99.8 kg (220 lb)   LMP  (LMP Unknown)   SpO2 99%   BMI 35.51 kg/m    Body mass index is 35.51 kg/m .    Wt Readings from Last 5 Encounters:   10/26/23 99.8 kg (220 lb)   06/06/23 99.3 kg (219 lb)   10/12/22 96.6 kg (213 lb)   12/14/18 103 kg (227 lb 1 oz)   07/02/18 102.3 kg (225 lb 9 oz)       Physical Exam   GENERAL:  Alert and no distress.  Appears ill but not toxic.  EYES: Eyes grossly normal to inspection, conjunctivae and sclerae normal  HENT: ear canals normal.  Right TM is bulging with clear fluid.  Nose and mouth without ulcers or lesions   NECK: Shotty cervical adenopathy  RESP: lungs clear to auscultation - no rales, rhonchi or wheezes  CV: regular rate and rhythm, normal S1 S2                      "

## 2023-11-17 ENCOUNTER — TRANSFERRED RECORDS (OUTPATIENT)
Dept: HEALTH INFORMATION MANAGEMENT | Facility: CLINIC | Age: 63
End: 2023-11-17
Payer: COMMERCIAL

## 2023-11-24 ENCOUNTER — TRANSFERRED RECORDS (OUTPATIENT)
Dept: HEALTH INFORMATION MANAGEMENT | Facility: CLINIC | Age: 63
End: 2023-11-24
Payer: COMMERCIAL

## 2023-12-18 ENCOUNTER — OFFICE VISIT (OUTPATIENT)
Dept: FAMILY MEDICINE | Facility: CLINIC | Age: 63
End: 2023-12-18
Payer: COMMERCIAL

## 2023-12-18 VITALS
BODY MASS INDEX: 35.39 KG/M2 | TEMPERATURE: 97.7 F | DIASTOLIC BLOOD PRESSURE: 98 MMHG | RESPIRATION RATE: 20 BRPM | OXYGEN SATURATION: 98 % | WEIGHT: 220.2 LBS | HEART RATE: 50 BPM | HEIGHT: 66 IN | SYSTOLIC BLOOD PRESSURE: 150 MMHG

## 2023-12-18 DIAGNOSIS — E66.812 CLASS 2 OBESITY WITH BODY MASS INDEX (BMI) OF 35.0 TO 35.9 IN ADULT, UNSPECIFIED OBESITY TYPE, UNSPECIFIED WHETHER SERIOUS COMORBIDITY PRESENT: ICD-10-CM

## 2023-12-18 DIAGNOSIS — E78.00 PURE HYPERCHOLESTEROLEMIA: ICD-10-CM

## 2023-12-18 DIAGNOSIS — R73.03 PREDIABETES: Primary | ICD-10-CM

## 2023-12-18 DIAGNOSIS — E03.9 HYPOTHYROIDISM, UNSPECIFIED TYPE: ICD-10-CM

## 2023-12-18 DIAGNOSIS — R79.89 ELEVATED SERUM CREATININE: ICD-10-CM

## 2023-12-18 DIAGNOSIS — R03.0 ELEVATED BLOOD PRESSURE READING WITHOUT DIAGNOSIS OF HYPERTENSION: ICD-10-CM

## 2023-12-18 LAB
ALBUMIN UR-MCNC: 100 MG/DL
APPEARANCE UR: CLEAR
BACTERIA #/AREA URNS HPF: ABNORMAL /HPF
BILIRUB UR QL STRIP: NEGATIVE
COLOR UR AUTO: YELLOW
GLUCOSE UR STRIP-MCNC: NEGATIVE MG/DL
HBA1C MFR BLD: 5.6 % (ref 0–5.6)
HGB UR QL STRIP: NEGATIVE
KETONES UR STRIP-MCNC: NEGATIVE MG/DL
LEUKOCYTE ESTERASE UR QL STRIP: NEGATIVE
NITRATE UR QL: NEGATIVE
PH UR STRIP: 5.5 [PH] (ref 5–8)
RBC #/AREA URNS AUTO: ABNORMAL /HPF
SP GR UR STRIP: 1.01 (ref 1–1.03)
SQUAMOUS #/AREA URNS AUTO: ABNORMAL /LPF
UROBILINOGEN UR STRIP-ACNC: 0.2 E.U./DL
WBC #/AREA URNS AUTO: ABNORMAL /HPF

## 2023-12-18 PROCEDURE — 36415 COLL VENOUS BLD VENIPUNCTURE: CPT | Performed by: FAMILY MEDICINE

## 2023-12-18 PROCEDURE — 80048 BASIC METABOLIC PNL TOTAL CA: CPT | Performed by: FAMILY MEDICINE

## 2023-12-18 PROCEDURE — 81001 URINALYSIS AUTO W/SCOPE: CPT | Performed by: FAMILY MEDICINE

## 2023-12-18 PROCEDURE — 99214 OFFICE O/P EST MOD 30 MIN: CPT | Performed by: FAMILY MEDICINE

## 2023-12-18 PROCEDURE — 83036 HEMOGLOBIN GLYCOSYLATED A1C: CPT | Performed by: FAMILY MEDICINE

## 2023-12-18 PROCEDURE — 80061 LIPID PANEL: CPT | Performed by: FAMILY MEDICINE

## 2023-12-18 RX ORDER — LEVOTHYROXINE SODIUM 150 MCG
TABLET ORAL
COMMUNITY
Start: 2023-04-10

## 2023-12-18 RX ORDER — CYANOCOBALAMIN (VITAMIN B-12) 500 MCG
400 LOZENGE ORAL DAILY
COMMUNITY

## 2023-12-18 RX ORDER — UBIDECARENONE 100 MG
100 CAPSULE ORAL DAILY
COMMUNITY

## 2023-12-18 RX ORDER — GINSENG 100 MG
200 CAPSULE ORAL
COMMUNITY

## 2023-12-18 RX ORDER — OMEGA-3/DHA/EPA/FISH OIL 60 MG-90MG
CAPSULE ORAL
COMMUNITY

## 2023-12-18 NOTE — PATIENT INSTRUCTIONS
Inocencia,    Increase your aerobic exercise as you are able  Limit carbohydrates in your diet (pastas, starches, and pastas)  I sent a prescription for Ozempic to your pharmacy.  Will start at 0.25 mg weekly.  This can be increased after 1 month  I recommend follow-up with Dr. Bailey for her weight loss program  I will have Karen reach out to you to help set up the intake evaluation   We will check laboratory testing today    Hardeep Sharp MD

## 2023-12-18 NOTE — PROGRESS NOTES
Assessment & Plan     Prediabetes    Her last hemoglobin A1c was 5.7% and she is 5.6% today  Reviewed the importance of limiting carbohydrates  She has multiple comorbidities and will refer for weight loss program  She will start semaglutide  Recommended increase aerobic exercise    - Basic metabolic panel; Future  - Hemoglobin A1c; Future  - semaglutide (OZEMPIC) 2 MG/3ML pen; Inject 0.25 mg Subcutaneous every 7 days  - Basic metabolic panel  - Hemoglobin A1c    Elevated blood pressure reading without diagnosis of hypertension  Recommend improvement in diet and exercise  She has had elevated readings most recently  Discussed starting antihypertensive medication though her preference is to follow-up to work on weight loss initially  Will monitor blood pressure    Reviewed cardiac risk factors and recommend follow-up in the future for a reevaluation  Discussed additional evaluation including EKG  Discussed potential indications for a stress test    Hypothyroidism, unspecified type  She follows up with Dr. Pryor for weight loss  She will continue Synthroid      Pure hypercholesterolemia  Check a lipid cascade  Calculated 10-year cardiovascular risk    - Lipid panel reflex to direct LDL Fasting; Future  - Lipid panel reflex to direct LDL Fasting    Class 2 obesity with body mass index (BMI) of 35.0 to 35.9 in adult, unspecified obesity type, unspecified whether serious comorbidity present  She has a history of obesity and will follow-up with Dr. Bailey to review treatment options  She has inserted semaglutide no may need changes based on the recommendation of Dr. Bailey    Elevated serum creatinine  Check laboratory testing as noted  - UA with Microscopic - lab collect; Future  - UA with Microscopic - lab collect  - Urine Microscopic Exam    Spent 30 minutes including time for chart preparation and review as well as time with patient and in reviewing the plan             BMI:   Estimated body mass index is 35.54  "kg/m  as calculated from the following:    Height as of this encounter: 1.676 m (5' 6\").    Weight as of this encounter: 99.9 kg (220 lb 3.2 oz).           Hardeep Sharp MD  [facility]    Catracho Pro is a 63 year old, presenting for the following health issues:  Follow Up (Review labs and follow up )        12/18/2023     1:32 PM   Additional Questions   Roomed by Svetlana ROBERTO CMA       History of Present Illness       Hyperlipidemia:  She presents for follow up of hyperlipidemia.   She is not taking medication to lower cholesterol. She is not having myalgia or other side effects to statin medications.    Hypertension: She presents for follow up of hypertension.  She does not check blood pressure  regularly outside of the clinic. Outside blood pressures have been over 140/90. She follows a low salt diet.     Hypothyroidism:     Since last visit, patient describes the following symptoms::  None    She eats 2-3 servings of fruits and vegetables daily.She consumes 0 sweetened beverage(s) daily.She exercises with enough effort to increase her heart rate 10 to 19 minutes per day.  She exercises with enough effort to increase her heart rate 3 or less days per week.   She is taking medications regularly.     Inocencia is a pleasant 63-year-old female who presents to the clinic in follow-up for multiple medical concerns.  She most recently was seen for a physical examination in June of this year.    Medical history is notable for hypercholesterolemia, vitamin D deficiency, and hypothyroidism.  She follows up with ophthalmology given history of glaucoma.     She has followed up with Dr. Pryor, endocrinology, given hypothyroidism and is treated with Synthroid.    Laboratory testing revealed a hemoglobin A1c of 5.7%.  Her creatinine was mildly elevated at 0.98.  She has had multiple blood pressure readings recently.  Her primary concern is that she struggles to lose weight. She has " "participated in [employer] but would really like to consider other options.  She states she struggles to get regular aerobic exercise.                Review of Systems         Objective    BP (!) 150/98 (BP Location: Left arm, Patient Position: Sitting, Cuff Size: Adult Large)   Pulse 50   Temp 97.7  F (36.5  C) (Oral)   Resp 20   Ht 1.676 m (5' 6\")   Wt 99.9 kg (220 lb 3.2 oz)   LMP  (LMP Unknown)   SpO2 98%   BMI 35.54 kg/m    Body mass index is 35.54 kg/m .  Physical Exam   GENERAL: healthy, alert and no distress  EYES: Eyes grossly normal to inspection, PERRL and conjunctivae and sclerae normal  RESP: lungs clear to auscultation - no rales, rhonchi or wheezes  CV: regular rate and rhythm, normal S1 S2, no S3 or S4, no murmur, click or rub, no peripheral edema and peripheral pulses strong  NEURO: Normal strength and tone, mentation intact and speech normal  PSYCH: mentation appears normal, affect normal/bright                      Answers submitted by the patient for this visit:  Lipid Visit (Submitted on 12/18/2023)  Chief Complaint: Chronic problems general questions HPI Form  Are you regularly taking any medication or supplement to lower your cholesterol?: No  Are you having muscle aches or other side effects that you think could be caused by your cholesterol lowering medication?: No  Hypertension Visit (Submitted on 12/18/2023)  Chief Complaint: Chronic problems general questions HPI Form  Do you check your blood pressure regularly outside of the clinic?: No  Are your blood pressures ever more than 140 on the top number (systolic) OR more than 90 on the bottom number (diastolic)? (For example, greater than 140/90): Yes  Are you following a low salt diet?: Yes  Hypothyroid  (Submitted on 12/18/2023)  Chief Complaint: Chronic problems general questions HPI Form  Since last visit, patient describes the following symptoms:: None  General Questionnaire (Submitted on 12/18/2023)  Chief Complaint: Chronic " problems general questions HPI Form  How many servings of fruits and vegetables do you eat daily?: 2-3  On average, how many sweetened beverages do you drink each day (Examples: soda, juice, sweet tea, etc.  Do NOT count diet or artificially sweetened beverages)?: 0  How many minutes a day do you exercise enough to make your heart beat faster?: 10 to 19  How many days a week do you exercise enough to make your heart beat faster?: 3 or less  How many days per week do you miss taking your medication?: 0

## 2023-12-19 ENCOUNTER — TELEPHONE (OUTPATIENT)
Dept: FAMILY MEDICINE | Facility: CLINIC | Age: 63
End: 2023-12-19
Payer: COMMERCIAL

## 2023-12-19 LAB
ANION GAP SERPL CALCULATED.3IONS-SCNC: 12 MMOL/L (ref 7–15)
BUN SERPL-MCNC: 15.6 MG/DL (ref 8–23)
CALCIUM SERPL-MCNC: 9.6 MG/DL (ref 8.8–10.2)
CHLORIDE SERPL-SCNC: 101 MMOL/L (ref 98–107)
CHOLEST SERPL-MCNC: 261 MG/DL
CREAT SERPL-MCNC: 0.97 MG/DL (ref 0.51–0.95)
DEPRECATED HCO3 PLAS-SCNC: 25 MMOL/L (ref 22–29)
EGFRCR SERPLBLD CKD-EPI 2021: 66 ML/MIN/1.73M2
FASTING STATUS PATIENT QL REPORTED: NO
GLUCOSE SERPL-MCNC: 97 MG/DL (ref 70–99)
HDLC SERPL-MCNC: 42 MG/DL
LDLC SERPL CALC-MCNC: 166 MG/DL
NONHDLC SERPL-MCNC: 219 MG/DL
POTASSIUM SERPL-SCNC: 3.9 MMOL/L (ref 3.4–5.3)
SODIUM SERPL-SCNC: 138 MMOL/L (ref 135–145)
TRIGL SERPL-MCNC: 266 MG/DL

## 2023-12-19 NOTE — TELEPHONE ENCOUNTER
Prior authorization needed.     semaglutide (OZEMPIC) 2 MG/3ML pen 3 mL 1 12/18/2023  No   Sig - Route: Inject 0.25 mg Subcutaneous every 7 days - Subcutaneous   Sent to pharmacy as: Semaglutide(0.25 or 0.5MG/DOS) 2 MG/3ML Solution Pen-injector (OZEMPIC)   Class: E-Prescribe   Order: [id number]   E-Prescribing Status: Receipt confirmed by pharmacy (12/18/2023  2:10 PM CST)   Prediabetes [R73.03]  - Primary    Awake/Alert/Cooperative

## 2023-12-22 NOTE — TELEPHONE ENCOUNTER
Central Prior Authorization Team   Phone: [phone]    PA Initiation    Medication: semaglutide (OZEMPIC) 2 MG/3ML pen  Insurance Company: [employer] ([facility]) - Phone [phone] Fax [phone]  Pharmacy Filling the Rx: [facility] [address] [place], [place] [address], [place] - [address]  Filling Pharmacy Phone: [phone]  Filling Pharmacy Fax:    Start Date: 12/22/2023

## 2023-12-26 NOTE — TELEPHONE ENCOUNTER
PRIOR AUTHORIZATION DENIED    Medication: semaglutide (OZEMPIC) 2 MG/3ML pen    Denial Date: 12/26/2023    Denial Rational:     Why was my request denied?  This request was denied because you did not meet the following requirements:  The requested medication and/or diagnosis are not a covered benefit and excluded from coverage in  accordance with the terms and conditions of your plan benefit. Therefore, the request has been  administratively denied.  The requested medication and/or diagnosis are not a covered benefit and are excluded from coverage  in accordance with the terms and conditions of your plan benefit. Therefore, this request has been  administratively denied.  The reason(s) [employer] [facility] did not approve this medication can be found above. This denial is based on  our Ozempic drug coverage policy, in addition to any supplementary information you or your prescriber  may have submitted.    Appeal Information: Review the plan's reasons for denial listed above. Please utilize that information to complete letter and provide specific, detailed clinical information/rationale of your patient's health status to address their denial reasons.

## 2023-12-28 NOTE — TELEPHONE ENCOUNTER
Please notify patient that Ozempic was not covered. She does not have diabetes and her most recent A1c actually normalized from the prediabeticrange.  In this case the next best step is to follow-up with  Dr. Bailey regarding weight loss. She will develop a plan and work to see what the best options are.

## 2024-01-02 ENCOUNTER — TELEPHONE (OUTPATIENT)
Dept: FAMILY MEDICINE | Facility: CLINIC | Age: 64
End: 2024-01-02
Payer: COMMERCIAL

## 2024-01-02 NOTE — TELEPHONE ENCOUNTER
She was questioning the Creatinine level and Squamous epithelials in her urine.  Is there any follow up recommended?      She also wanted to let you know that she's decided not to go on Ozempic.  I did let her know that PA was denied because she's not diabetic anyway.

## 2024-02-06 ENCOUNTER — OFFICE VISIT (OUTPATIENT)
Dept: FAMILY MEDICINE | Facility: CLINIC | Age: 64
End: 2024-02-06
Payer: COMMERCIAL

## 2024-02-06 VITALS
DIASTOLIC BLOOD PRESSURE: 87 MMHG | SYSTOLIC BLOOD PRESSURE: 155 MMHG | HEIGHT: 66 IN | HEART RATE: 63 BPM | OXYGEN SATURATION: 98 % | RESPIRATION RATE: 16 BRPM | BODY MASS INDEX: 35.03 KG/M2 | WEIGHT: 218 LBS

## 2024-02-06 DIAGNOSIS — G89.29 CHRONIC LOW BACK PAIN, UNSPECIFIED BACK PAIN LATERALITY, UNSPECIFIED WHETHER SCIATICA PRESENT: ICD-10-CM

## 2024-02-06 DIAGNOSIS — M54.50 CHRONIC LOW BACK PAIN, UNSPECIFIED BACK PAIN LATERALITY, UNSPECIFIED WHETHER SCIATICA PRESENT: ICD-10-CM

## 2024-02-06 DIAGNOSIS — E78.00 PURE HYPERCHOLESTEROLEMIA: ICD-10-CM

## 2024-02-06 DIAGNOSIS — E66.01 CLASS 2 SEVERE OBESITY WITH SERIOUS COMORBIDITY AND BODY MASS INDEX (BMI) OF 35.0 TO 35.9 IN ADULT, UNSPECIFIED OBESITY TYPE (H): Primary | ICD-10-CM

## 2024-02-06 DIAGNOSIS — R03.0 ELEVATED BP WITHOUT DIAGNOSIS OF HYPERTENSION: ICD-10-CM

## 2024-02-06 DIAGNOSIS — E66.812 CLASS 2 SEVERE OBESITY WITH SERIOUS COMORBIDITY AND BODY MASS INDEX (BMI) OF 35.0 TO 35.9 IN ADULT, UNSPECIFIED OBESITY TYPE (H): Primary | ICD-10-CM

## 2024-02-06 DIAGNOSIS — R73.03 PREDIABETES: ICD-10-CM

## 2024-02-06 PROCEDURE — 99215 OFFICE O/P EST HI 40 MIN: CPT | Performed by: FAMILY MEDICINE

## 2024-02-06 RX ORDER — METFORMIN HCL 500 MG
1000 TABLET, EXTENDED RELEASE 24 HR ORAL 2 TIMES DAILY WITH MEALS
Qty: 120 TABLET | Refills: 3 | Status: SHIPPED | OUTPATIENT
Start: 2024-02-06

## 2024-02-06 NOTE — PATIENT INSTRUCTIONS
You need to start the metformin slowly and titrate up weekly.  So for week 1, just take 1 tablet with breakfast.  Week 2: 1 tablet with breakfast, 1 tablet with dinner  Week 3: 2 tablets with breakfast, 1 tablet with dinner  Week 4: 2 tablets with breakfast, 2 tablets with dinner.    If at any point you are experiencing GI upset or diarrhea, you can slow down the titration as you tolerate it.

## 2024-02-06 NOTE — PROGRESS NOTES
"    New Medical Weight Management Consult    PATIENT:  Inocencia Oneal  MRN:         [id number]  :         1960  MALIK:         2024    Dear Aron,    I had the pleasure of seeing your patient, Inocencia Oneal. Full intake/assessment was done to determine barriers to weight loss success and develop a treatment plan. Inocencia Oneal is a 63 year old female interested in treatment of medical problems associated with excess weight. She has a height of 5' 6\", a weight of 218 lbs 0 oz, and the calculated Body mass index is 35.19 kg/m .    ASSESSMENT/PLAN:  1. Class 2 severe obesity with serious comorbidity and body mass index (BMI) of 35.0 to 35.9 in adult, unspecified obesity type (H)  Inocencia presents for comprehensive weight management intake today.  Her comorbidities include hypertension, high cholesterol, pedal edema, joint pain, and prediabetes.  She was able to set some long-term and short-term goals as discussed below.  After discussion of medications, she will try metformin.  Reviewed potential adverse side effects and how to titrate this up slowly over the next 1 month.  Her blood pressure was too high to start phentermine today.  - metFORMIN (GLUCOPHAGE XR) 500 MG 24 hr tablet; Take 2 tablets (1,000 mg) by mouth 2 times daily (with meals)  Dispense: 120 tablet; Refill: 3    2. Essential Hypercholesterolemia    3. Chronic low back pain, unspecified back pain laterality, unspecified whether sciatica present      4. Prediabetes      5. Elevated BP without diagnosis of hypertension  She has been monitoring her blood pressures but has not started medication.  She may want to consider this in the future if numbers remain high.      She has an overall goal of having better health.  She thought it a weight of 1 75-1 80 but was last this weight prior to having children.  Discussed this may be an unrealistic goal and that most people can attain approximately 15% weight loss with lifestyle " changes medication.  She was able to set some short-term goals in the following categories:  Behavioral: We discussed trying to track using my fitness pal so that we can get a better idea of her total carbs and proteins.  She will try to make some substitutions that she is currently doing quite a few carbs and not much in the way of protein except for dinner.  Nutritional: We discussed goal is to keep carbs under 125 g and proteins around 60 to 70 g.  Activity: I challenged her to think about any physical activity that she could tolerate that would work on lean muscle mass such as strength training or resistance training.    She was sent by her primary care physician.  She states that about 2 years ago her maximum weight was 260 pounds.  She got down to about 195 pounds using [employer] program.  She states that she felt starved all the time.  The food was not something that was sustainable.  She did this for a wedding.  She since then has gained at least some of the weight back.  She states that over the years she has struggled with her weight and has tried many diets.  She has done quite a few episodes where she will lose quite a bit of weight and then she will regain.  We discussed decrease in lean muscle mass with each episode and this is likely why she has such a slow metabolism.  She feels like the her biggest barrier to weight loss is that she hates to exercise.  She will do some walking and tries to get 10,000 steps per day but does not like to do anything vigorous or strength training.  She has been writing down what she is eating and a block in telling of her calories but she is not really watching proteins or carbs.  Her recall from yesterday is as follows:  Breakfast: Cereal oats with half a cup or raspberries, blackberries and a half a pair or half apple with a tomato slice.  Lunch: Salad mainly just veggies  Dinner: Vegan sausage with a piece of bread avocado, tomato lettuce.  Vegetables with vegan cheese.   "1 cup popcorn.  She tries not to eat after 8 PM at night.  She is not drinking any sugar sweetened beverages.    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  She states she will do some walking and tries to get 10,000 steps.  She overall does not like specific organized exercise.  In the summer she is a little bit more active and will do some biking.    She denies any disordered eating and screens negative for binge eating disorder.  She screens negative for sleep apnea.    We did review medications and she is open to maybe trying some things.  We do not think Wegovy is covered on her insurance and due to supply chain issues, we will hold off on that for now.  Due to her high blood pressure, would not start phentermine.  Will start by trying with metformin.  Review potential adverse side effects including GI upset and diarrhea.  Will have her slowly titrate this up over a month.  She will then follow-up in 6 to 8 weeks.  I did offer dietitian but she was not sure she wanted to do that.      She has the following co-morbidities:        1/15/2024    12:34 PM   --   I have the following health issues associated with obesity High Blood Pressure    High Cholesterol    Fatty Liver    Hypothyroidism   I have the following symptoms associated with obesity Lower Extremity Swelling    Back Pain    Fatigue    Hip Pain            No data to display                    1/15/2024    12:34 PM   Referring Provider   Please name the provider who referred you to Medical Weight Management  If you do not know, please answer \"I Don't Know\" Dr. Hardeep Cooper           1/15/2024    12:34 PM   Weight History   How concerned are you about your weight? Very Concerned   I became overweight As a Teenager   The following factors have contributed to my weight gain Eating Wrong Types of Food    Eating Too Much    Lack of Exercise    Genetic (Runs in the Family)   I have tried the following methods to lose weight " Watching Portions or Calories    Exercise    Weight Watchers    Atkins-type Diet (Low Carb/High Protein)    Nutrisystem    Slim Fast or Other Liquid Diets    Meal Replacements    Fasting   My lowest weight since age 19 was 175   My highest weight since age 19 was 250   The most weight I have ever lost was (lbs) 30   I have the following family history of obesity/being overweight I am the only one in my immediate family who is overweight    Many of my relatives are overweight   How has your weight changed over the last year? Lost   How many pounds? 30           1/15/2024    12:34 PM   Diet Recall Review with Patient   If you do eat breakfast, what types of food do you eat? Fruit, oatmeal, plain yogurt, low aleks bread one slice, tomato   If you do eat supper, what types of food do you typically eat? Salad, vegetables, chicken, lean beef, turkey   If you do snack, what types of food do you typically eat? Sometimes almonds, low aleks bar, tomato cottage cheese, rice cracker   How many glasses of juice do you drink in a typical day? 0   How many of glasses of milk do you drink in a typical day? 0   How many 8oz glasses of sugar containing drinks such as Valentín-Aid/sweet tea do you drink in a day? 0   How many cans/bottles of sugar pop/soda/tea/sports drinks do you drink in a day? 0   How many cans/bottles of diet pop/soda/tea or sports drink do you drink in a day? 0   How often do you have a drink of alcohol? Monthly or Less   If you do drink, how many drinks might you have in a day? 1 or 2           1/15/2024    12:34 PM   Eating Habits   Generally, my meals include foods like these bread, pasta, rice, potatoes, corn, crackers, sweet dessert, pop, or juice Never   Generally, my meals include foods like these fried meats, brats, burgers, french fries, pizza, cheese, chips, or ice cream Never   Eat fast food (like McDonalds, Burger Choco, Taco Bell) Never   Eat at a buffet or sit-down restaurant Never   Eat most of my meals in  front of the TV or computer A Few Times a Week   Often skip meals, eat at random times, have no regular eating times A Few Times a Week   Rarely sit down for a meal but snack or graze throughout Never   Eat extra snacks between meals Everyday   Eat most of my food at the end of the day Never   Eat in the middle of the night or wake up at night to eat Never   Eat extra snacks to prevent or correct low blood sugar A Few Times a Week   Eat to prevent acid reflux or stomach pain Never   Worry about not having enough food to eat Never   I eat when I am depressed Never   I eat when I am stressed Never   I eat when I am bored Never   I eat when I am anxious Never   I eat when I am happy or as a reward Never   I feel hungry all the time even if I just have eaten Never   Feeling full is important to me Never   I finish all the food on my plate even if I am already full Never   I can't resist eating delicious food or walk past the good food/smell Never   I eat/snack without noticing that I am eating A Few Times a Week   I eat when I am preparing the meal Never   I eat more than usual when I see others eating Almost Everyday   I have trouble not eating sweets, ice cream, cookies, or chips if they are around the house Everyday   I think about food all day Never   What foods, if any, do you crave? Chips/Crackers           1/15/2024    12:34 PM   Amount of Food   I feel out of control when eating Never   I eat a large amount of food, like a loaf of bread, a box of cookies, a pint/quart of ice cream, all at once Never   I eat a large amount of food even when I am not hungry Never   I eat rapidly Everyday   I eat alone because I feel embarrassed and do not want others to see how much I have eaten Never   I eat until I am uncomfortably full Never   I feel bad, disgusted, or guilty after I overeat Monthly           1/15/2024    12:34 PM   Activity/Exercise History   How much of a typical 12 hour day do you spend sitting? Less Than  Half the Day   How much of a typical 12 hour day do you spend lying down? Less Than Half the Day   How much of a typical day do you spend walking/standing? Half the Day   How many hours (not including work) do you spend on the TV/Video Games/Computer/Tablet/Phone? 2-3 Hours   How many times a week are you active for the purpose of exercise? 2-3 Times a Week   What keeps you from being more active? Pain    Too tired    Other   How many total minutes do you spend doing some activity for the purpose of exercising when you exercise? 15-30 Minutes       PAST MEDICAL HISTORY:  No past medical history on file.        1/15/2024    12:34 PM   Work/Social History Reviewed With Patient   My employment status is Retired   What is your marital status? /In a Relationship   Who do you live with?    Who does the food shopping? Me           1/15/2024    12:34 PM   Mental Health History Reviewed With Patient   Have you ever been physically or sexually abused? No   How often in the past 2 weeks have you felt little interest or pleasure in doing things? Nearly Everyday   Over the past 2 weeks how often have you felt down, depressed, or hopeless? More Than Half the Days           1/15/2024    12:34 PM   Sleep History Reviewed With Patient   How many hours do you sleep at night? 6       MEDICATIONS:   Current Outpatient Medications   Medication Sig Dispense Refill    ammonium lactate (AMLACTIN) 12 % external cream Apply topically to affected area of trunk and extremities BID prn 385 g 1    betamethasone dipropionate (DIPROSONE) 0.05 % external cream Apply to affected area BID prn 45 g 1    betamethasone dipropionate (DIPROSONE) 0.05 % external lotion Apply to affected area of scalp Qday prn 60 mL 1    cholecalciferol, vitamin D3, (VITAMIN D3) 1,000 unit capsule [CHOLECALCIFEROL, VITAMIN D3, (VITAMIN D3) 1,000 UNIT CAPSULE] Take by mouth daily. 2 capsules daily      co-enzyme Q-10 100 MG CAPS capsule Take 100 mg by mouth  "daily      cranberry 200 MG CAPS capsule Take 200 mg by mouth 3 times daily (with meals)      Cyanocobalamin (B-12) 1000 MCG TBCR       fish oil-omega-3 fatty acids 500 MG capsule       latanoprost (XALATAN) 0.005 % ophthalmic solution Apply 1 drop INTO Both Eyes once a daY      MULTIVIT &MINERALS/FERROUS FUM (MULTI VITAMIN ORAL) [MULTIVIT &MINERALS/FERROUS FUM (MULTI VITAMIN ORAL)] Take by mouth daily.      SYNTHROID 150 MCG tablet Take 1 Tablet (150 mcg) by mouth once daily.      triamcinolone (KENALOG) 0.1 % external cream MIX 11 WITH KETOCONAZOLE.APPLY TO AFFECTED AREA TWICE DAILY FOR 2 WEEKS.      vitamin E 400 units TABS Take 400 Units by mouth daily         ALLERGIES:   No Known Allergies    PHYSICAL EXAM:  BP (!) 155/87   Pulse 63   Resp 16   Ht 1.676 m (5' 6\")   Wt 98.9 kg (218 lb)   LMP  (LMP Unknown)   SpO2 98%   BMI 35.19 kg/m      Waist circumference:      Wt Readings from Last 4 Encounters:   02/06/24 98.9 kg (218 lb)   12/18/23 99.9 kg (220 lb 3.2 oz)   10/26/23 99.8 kg (220 lb)   06/06/23 99.3 kg (219 lb)     A & O x 3  HEENT: NCAT, mucous membranes moist  Respirations unlabored  Location of obesity: Mixed Obesity    FOLLOW-UP:   8 weeks.    TIME: 65 min spent on evaluation, management, counseling, education, & motivational interviewing with greater than 50 % of the total time was spent on counseling and coordinating care    Sincerely,    Jazmine Bailey MD        "

## 2024-05-07 ENCOUNTER — PATIENT OUTREACH (OUTPATIENT)
Dept: CARE COORDINATION | Facility: CLINIC | Age: 64
End: 2024-05-07
Payer: COMMERCIAL

## 2024-05-21 ENCOUNTER — PATIENT OUTREACH (OUTPATIENT)
Dept: CARE COORDINATION | Facility: CLINIC | Age: 64
End: 2024-05-21
Payer: COMMERCIAL

## 2024-07-14 ENCOUNTER — HEALTH MAINTENANCE LETTER (OUTPATIENT)
Age: 64
End: 2024-07-14

## 2024-08-11 ENCOUNTER — NURSE TRIAGE (OUTPATIENT)
Dept: NURSING | Facility: CLINIC | Age: 64
End: 2024-08-11
Payer: COMMERCIAL

## 2024-08-11 DIAGNOSIS — U07.1 INFECTION DUE TO 2019 NOVEL CORONAVIRUS: Primary | ICD-10-CM

## 2024-08-11 NOTE — TELEPHONE ENCOUNTER
COVID Positive/Requesting COVID treatment    Patient is positive for COVID and requesting treatment options.    Date of positive COVID test (PCR or at home)? 8/10/24  Current COVID symptoms: fever or chills, cough, fatigue, muscle or body aches, headache, new loss of taste or smell, sore throat, and congestion or runny nose  Date COVID symptoms began: 8/10/24    Message should be routed to clinic RN pool. Best phone number to use for call back: [phone]    Patient requesting COVID treatment    Monday will be COVID day: 2  PCP: Hardeep Sharp  Last office visit: 2/6/24    Please follow up with patient for consideration of antiviral treatment.     Nurse Triage SBAR    Is this a 2nd Level Triage? NO    Situation: Patient is positive for COVID and would like to start on Paxlovid    Background:  is positive and is currently taking Paxlovid  High risk: age, obesity, hypothyroid       Assessment:   - patient is positive for COVID tested positive  Symptoms started yesterday   Denies chest pain or shortness of breath     Protocol Recommended Disposition:   Call PCP Within 24 Hours    Recommendation: Advised that a message will be sent to her PCP clinic to follow up with her tomorrow about getting Paxlovid started. Reviewed additional care advice with patient and she verbalizes understanding. Declines additional questions at this time.     Routed to clinic for Paxlovid protocol    Does the patient meet one of the following criteria for ADS visit consideration? 16+ years old, with an [facility] PCP     TIP  Providers, please consider if this condition is appropriate for management at one of our Acute and Diagnostic Services sites.     If patient is a good candidate, please use dotphrase <dot>triageresponse and select Refer to ADS to document.    Ashanti Edwards RN BSN 8/11/2024 1:52 PM    Reason for Disposition   [1] HIGH RISK patient (e.g., weak immune system, age > 64 years, obesity with BMI 30 or higher, pregnant,  chronic lung disease or other chronic medical condition) AND [2] COVID symptoms (e.g., cough, fever)  (Exceptions: Already seen by PCP and no new or worsening symptoms.)    Additional Information   Negative: SEVERE difficulty breathing (e.g., struggling for each breath, speaks in single words)   Negative: Difficult to awaken or acting confused (e.g., disoriented, slurred speech)   Negative: Bluish (or gray) lips or face now   Negative: Shock suspected (e.g., cold/pale/clammy skin, too weak to stand, low BP, rapid pulse)   Negative: Sounds like a life-threatening emergency to the triager   Negative: [1] Diagnosed or suspected COVID-19 AND [2] symptoms lasting 3 or more weeks   Negative: [1] COVID-19 exposure AND [2] no symptoms   Negative: COVID-19 vaccine reaction suspected (e.g., fever, headache, muscle aches) occurring 1 to 3 days after getting vaccine   Negative: COVID-19 vaccine, questions about   Negative: [1] Lives with someone known to have influenza (flu test positive) AND [2] flu-like symptoms (e.g., cough, runny nose, sore throat, SOB; with or without fever)   Negative: [1] Possible COVID-19 symptoms AND [2] triager concerned about severity of symptoms or other causes   Negative: COVID-19 and breastfeeding, questions about   Negative: SEVERE or constant chest pain or pressure  (Exception: Mild central chest pain, present only when coughing.)   Negative: MODERATE difficulty breathing (e.g., speaks in phrases, SOB even at rest, pulse 100-120)   Negative: [1] Headache AND [2] stiff neck (can't touch chin to chest)   Negative: Oxygen level (e.g., pulse oximetry) 90 percent or lower   Negative: Chest pain or pressure  (Exception: MILD central chest pain, present only when coughing.)   Negative: [1] Drinking very little AND [2] dehydration suspected (e.g., no urine > 12 hours, very dry mouth, very lightheaded)   Negative: Patient sounds very sick or weak to the triager   Negative: MILD difficulty breathing  (e.g., minimal/no SOB at rest, SOB with walking, pulse <100)   Negative: Fever > 103 F (39.4 C)   Negative: [1] Fever > 101 F (38.3 C) AND [2] age > 60 years   Negative: [1] Fever > 100.0 F (37.8 C) AND [2] bedridden (e.g., CVA, chronic illness, recovering from surgery)   Negative: Oxygen level (e.g., pulse oximetry) 91 to 94 percent    Protocols used: Coronavirus (COVID-19) Diagnosed or [id number]

## 2024-08-12 NOTE — TELEPHONE ENCOUNTER
RN COVID TREATMENT VISIT  08/12/24      The patient has been triaged and does not require a higher level of care.    Inocencia Oneal  64 year old  Current weight? 207 lb    Has the patient been seen by a primary care provider at an [facility] or [facility] within the past two years? Yes.   Have you been in close proximity to/do you have a known exposure to a person with a confirmed case of influenza? No.     General treatment eligibility:  Date of positive COVID test (PCR or at home)?  8/10/24    Are you or have you been hospitalized for this COVID-19 infection? No.   Have you received monoclonal antibodies or antiviral treatment for COVID-19 since this positive test? No.   Do you have any of the following conditions that place you at risk of being very sick from COVID-19?   - Age 50 years or older  - Mental health disorders including mood disorders, depression, schizophrenia spectrum disorders   Yes, patient has at least one high risk condition as noted above.     Current COVID symptoms:   - fever or chills  - cough  - fatigue  - muscle or body aches  - headache  - new loss of taste or smell  - sore throat  - congestion or runny nose  Yes. Patient has at least one symptom as selected.     How many days since symptoms started? 5 days or less. Established patient, 12 years or older weighing at least 88.2 lbs, who has symptoms that started in the past 5 days, has not been hospitalized nor received treatment already, and is at risk for being very sick from COVID-19.     Treatment eligibility by RN:  Are you currently pregnant or nursing? No  Do you have a clinically significant hypersensitivity to nirmatrelvir or ritonavir, or toxic epidermal necrolysis (TEN) or Mcgrath-Darnell Syndrome? No  Do you have a history of hepatitis, any hepatic impairment on the Problem List (such as Child-Puentes Class C, cirrhosis, fatty liver disease, alcoholic liver disease), or was the last liver lab  (hepatic panel, ALT, AST, ALK Phos, bilirubin) elevated in the past 6 months? No  Do you have any history of severe renal impairment (eGFR < 30mL/min)? No    Is patient eligible to continue? Yes, patient meets all eligibility requirements for the RN COVID treatment (as denoted by all no responses above).     Current Outpatient Medications   Medication Sig Dispense Refill    ammonium lactate (AMLACTIN) 12 % external cream Apply topically to affected area of trunk and extremities BID prn 385 g 1    betamethasone dipropionate (DIPROSONE) 0.05 % external cream Apply to affected area BID prn 45 g 1    betamethasone dipropionate (DIPROSONE) 0.05 % external lotion Apply to affected area of scalp Qday prn 60 mL 1    cholecalciferol, vitamin D3, (VITAMIN D3) 1,000 unit capsule [CHOLECALCIFEROL, VITAMIN D3, (VITAMIN D3) 1,000 UNIT CAPSULE] Take by mouth daily. 2 capsules daily      co-enzyme Q-10 100 MG CAPS capsule Take 100 mg by mouth daily      cranberry 200 MG CAPS capsule Take 200 mg by mouth 3 times daily (with meals)      Cyanocobalamin (B-12) 1000 MCG TBCR       fish oil-omega-3 fatty acids 500 MG capsule       latanoprost (XALATAN) 0.005 % ophthalmic solution Apply 1 drop INTO Both Eyes once a daY      metFORMIN (GLUCOPHAGE XR) 500 MG 24 hr tablet Take 2 tablets (1,000 mg) by mouth 2 times daily (with meals) 120 tablet 3    MULTIVIT &MINERALS/FERROUS FUM (MULTI VITAMIN ORAL) [MULTIVIT &MINERALS/FERROUS FUM (MULTI VITAMIN ORAL)] Take by mouth daily.      SYNTHROID 150 MCG tablet Take 1 Tablet (150 mcg) by mouth once daily.      triamcinolone (KENALOG) 0.1 % external cream MIX 11 WITH KETOCONAZOLE.APPLY TO AFFECTED AREA TWICE DAILY FOR 2 WEEKS.      vitamin E 400 units TABS Take 400 Units by mouth daily         Medications from List 1 of the standing order (on medications that exclude the use of Paxlovid) that patient is taking: NONE. Is patient taking Shandra's Wort? No  Is patient taking Shandra's Wort or any meds  from List 1? No.   Medications from List 2 of the standing order (on meds that provider needs to adjust) that patient is taking: NONE. Is patient on any of the meds from List 2? No.   Medications from List 3 of standing order (on meds that a RN needs to adjust) that patient is taking: NONE. Is patient on any meds from List 3? No.     Paxlovid has an approximate 90% reduction in hospitalization. Paxlovid can possibly cause altered sense of taste, diarrhea (loose, watery stools), high blood pressure, muscle aches.     Would patient like a Paxlovid prescription?   Yes.   Lab Results   Component Value Date    GFRESTIMATED 66 12/18/2023       Was last eGFR reduced? No, eGFR 60 or greater/ No Result on record. Patient can receive the normal renal function dose. Paxlovid Rx sent to [place] [facility]   [facility]    Temporary change to home medications: None    All medication adjustments (holds, etc) were discussed with the patient and patient was asked to repeat back (teachback) their med adjustment.  Did patient understand med adjustment? No medication adjustments needed.         Reviewed the following instructions with the patient:    Paxlovid (nimatrelvir and ritonavir)    How it works  Two medicines (nirmatrelvir and ritonavir) are taken together. They stop the virus from growing. Less amount of virus is easier for your body to fight.    How to take  Medicine comes in a daily container with both medicine tablets. Take by mouth twice daily (once in the morning, once at night) for 5 days.  The number of tablets to take varies by patient.  Don't chew or break capsules. Swallow whole.    When to take  Take as soon as possible after positive COVID-19 test result, and within 5 days of your first symptoms.    Possible side effects  Can cause altered sense of taste, diarrhea (loose, watery stools), high blood pressure, muscle aches.    Danni Dee RN

## 2024-09-23 DIAGNOSIS — L85.3 DRY SKIN: ICD-10-CM

## 2024-09-25 RX ORDER — BETAMETHASONE DIPROPIONATE 0.5 MG/G
LOTION TOPICAL
Qty: 60 ML | Refills: 0 | Status: SHIPPED | OUTPATIENT
Start: 2024-09-25

## 2024-12-03 ENCOUNTER — PATIENT OUTREACH (OUTPATIENT)
Dept: CARE COORDINATION | Facility: CLINIC | Age: 64
End: 2024-12-03
Payer: COMMERCIAL

## 2025-02-12 ENCOUNTER — ANCILLARY PROCEDURE (OUTPATIENT)
Dept: MAMMOGRAPHY | Facility: CLINIC | Age: 65
End: 2025-02-12
Attending: FAMILY MEDICINE
Payer: COMMERCIAL

## 2025-02-12 DIAGNOSIS — Z12.31 VISIT FOR SCREENING MAMMOGRAM: ICD-10-CM

## 2025-02-12 PROCEDURE — 77063 BREAST TOMOSYNTHESIS BI: CPT

## 2025-02-27 NOTE — PROGRESS NOTES
Assessment & Plan   Problem List Items Addressed This Visit    None  Visit Diagnoses     Acute sinusitis, recurrence not specified, unspecified location    -  Primary    Relevant Medications    amoxicillin (AMOXIL) 875 MG tablet    predniSONE (DELTASONE) 20 MG tablet         Recommend Flonase nasal spray as well as saline sinus rinse daily      MEDICATIONS:   Orders Placed This Encounter   Medications     amoxicillin (AMOXIL) 875 MG tablet     Sig: Take 1 tablet (875 mg) by mouth 2 times daily     Dispense:  14 tablet     Refill:  0     predniSONE (DELTASONE) 20 MG tablet     Sig: Take 1 tablet (20 mg) by mouth daily     Dispense:  5 tablet     Refill:  0     There are no discontinued medications.       - Continue other medications without change    No follow-ups on file.    Trang Malone NP  [facility] ROMMEL Pro is a 62 year old, presenting for the following health issues:  Sinus Problem (Sinus pressure got worse on Sunday has been taking OTC medications its not helping. Going down into chest at home test negative )      HPI           Review of Systems   Unremarkable other than listed above and below         Objective    /80 (BP Location: Right arm, Patient Position: Sitting, Cuff Size: Adult Large)   Pulse 82   Temp 98.1  F (36.7  C) (Oral)   Wt 96.6 kg (213 lb)   SpO2 98%   BMI 34.38 kg/m    Body mass index is 34.38 kg/m .  Physical Exam   GENERAL: healthy, alert and no distress  EYES: Eyes grossly normal to inspection, PERRL and conjunctivae and sclerae normal  HENT: ear canals and TM's normal, nose and mouth without ulcers or lesions nasopharyngeal moderate congestion bogginess purulent drainage is noted  NECK: no adenopathy  RESP: lungs clear to auscultation - no rales, rhonchi or wheezes  CV: regular rate and rhythm, normal S1 S2,  PSYCH: mentation appears normal, affect normal/bright                    
No

## 2025-03-11 DIAGNOSIS — L85.3 DRY SKIN: ICD-10-CM

## 2025-03-13 NOTE — TELEPHONE ENCOUNTER
Called patient to assist in scheduling visit. Once scheduled route medication request to provider.     Clifton Olson RN

## 2025-03-19 RX ORDER — AMMONIUM LACTATE 12 G/100G
CREAM TOPICAL
Qty: 385 G | Refills: 0 | OUTPATIENT
Start: 2025-03-19

## 2025-04-29 ENCOUNTER — TRANSFERRED RECORDS (OUTPATIENT)
Dept: HEALTH INFORMATION MANAGEMENT | Facility: CLINIC | Age: 65
End: 2025-04-29
Payer: COMMERCIAL

## 2025-05-14 ENCOUNTER — OFFICE VISIT (OUTPATIENT)
Dept: FAMILY MEDICINE | Facility: CLINIC | Age: 65
End: 2025-05-14
Payer: COMMERCIAL

## 2025-05-14 VITALS
WEIGHT: 220.2 LBS | DIASTOLIC BLOOD PRESSURE: 93 MMHG | SYSTOLIC BLOOD PRESSURE: 147 MMHG | TEMPERATURE: 97.6 F | OXYGEN SATURATION: 96 % | HEART RATE: 71 BPM | BODY MASS INDEX: 35.54 KG/M2 | RESPIRATION RATE: 16 BRPM

## 2025-05-14 DIAGNOSIS — Z13.6 SCREENING FOR CARDIOVASCULAR CONDITION: ICD-10-CM

## 2025-05-14 DIAGNOSIS — L85.3 DRY SKIN: ICD-10-CM

## 2025-05-14 DIAGNOSIS — E03.9 HYPOTHYROIDISM, UNSPECIFIED TYPE: Primary | ICD-10-CM

## 2025-05-14 DIAGNOSIS — E55.9 VITAMIN D DEFICIENCY: ICD-10-CM

## 2025-05-14 DIAGNOSIS — E66.812 CLASS 2 SEVERE OBESITY WITH SERIOUS COMORBIDITY AND BODY MASS INDEX (BMI) OF 35.0 TO 35.9 IN ADULT, UNSPECIFIED OBESITY TYPE (H): ICD-10-CM

## 2025-05-14 DIAGNOSIS — R03.0 ELEVATED BLOOD PRESSURE READING WITHOUT DIAGNOSIS OF HYPERTENSION: ICD-10-CM

## 2025-05-14 DIAGNOSIS — R73.03 PREDIABETES: ICD-10-CM

## 2025-05-14 DIAGNOSIS — E66.01 CLASS 2 SEVERE OBESITY WITH SERIOUS COMORBIDITY AND BODY MASS INDEX (BMI) OF 35.0 TO 35.9 IN ADULT, UNSPECIFIED OBESITY TYPE (H): ICD-10-CM

## 2025-05-14 DIAGNOSIS — E78.00 PURE HYPERCHOLESTEROLEMIA: ICD-10-CM

## 2025-05-14 DIAGNOSIS — L40.9 PSORIASIS: ICD-10-CM

## 2025-05-14 LAB
ERYTHROCYTE [DISTWIDTH] IN BLOOD BY AUTOMATED COUNT: 12.4 % (ref 10–15)
EST. AVERAGE GLUCOSE BLD GHB EST-MCNC: 123 MG/DL
HBA1C MFR BLD: 5.9 % (ref 0–5.6)
HCT VFR BLD AUTO: 44 % (ref 35–47)
HGB BLD-MCNC: 14.9 G/DL (ref 11.7–15.7)
MCH RBC QN AUTO: 29.7 PG (ref 26.5–33)
MCHC RBC AUTO-ENTMCNC: 33.9 G/DL (ref 31.5–36.5)
MCV RBC AUTO: 88 FL (ref 78–100)
PLATELET # BLD AUTO: 268 10E3/UL (ref 150–450)
RBC # BLD AUTO: 5.02 10E6/UL (ref 3.8–5.2)
WBC # BLD AUTO: 6 10E3/UL (ref 4–11)

## 2025-05-14 PROCEDURE — 84443 ASSAY THYROID STIM HORMONE: CPT | Performed by: FAMILY MEDICINE

## 2025-05-14 PROCEDURE — 82248 BILIRUBIN DIRECT: CPT | Performed by: FAMILY MEDICINE

## 2025-05-14 PROCEDURE — 85027 COMPLETE CBC AUTOMATED: CPT | Performed by: FAMILY MEDICINE

## 2025-05-14 PROCEDURE — 36415 COLL VENOUS BLD VENIPUNCTURE: CPT | Performed by: FAMILY MEDICINE

## 2025-05-14 PROCEDURE — 3080F DIAST BP >= 90 MM HG: CPT | Performed by: FAMILY MEDICINE

## 2025-05-14 PROCEDURE — G2211 COMPLEX E/M VISIT ADD ON: HCPCS | Performed by: FAMILY MEDICINE

## 2025-05-14 PROCEDURE — 3077F SYST BP >= 140 MM HG: CPT | Performed by: FAMILY MEDICINE

## 2025-05-14 PROCEDURE — 82306 VITAMIN D 25 HYDROXY: CPT | Performed by: FAMILY MEDICINE

## 2025-05-14 PROCEDURE — 80061 LIPID PANEL: CPT | Performed by: FAMILY MEDICINE

## 2025-05-14 PROCEDURE — 83036 HEMOGLOBIN GLYCOSYLATED A1C: CPT | Performed by: FAMILY MEDICINE

## 2025-05-14 PROCEDURE — 99215 OFFICE O/P EST HI 40 MIN: CPT | Performed by: FAMILY MEDICINE

## 2025-05-14 PROCEDURE — 80053 COMPREHEN METABOLIC PANEL: CPT | Performed by: FAMILY MEDICINE

## 2025-05-14 RX ORDER — VIT C/B6/B5/MAGNESIUM/HERB 173 50-5-6-5MG
CAPSULE ORAL
COMMUNITY

## 2025-05-14 RX ORDER — BETAMETHASONE DIPROPIONATE 0.5 MG/G
LOTION TOPICAL
Qty: 60 ML | Refills: 1 | Status: SHIPPED | OUTPATIENT
Start: 2025-05-14

## 2025-05-14 RX ORDER — BETAMETHASONE DIPROPIONATE 0.5 MG/G
CREAM TOPICAL
Qty: 45 G | Refills: 1 | Status: SHIPPED | OUTPATIENT
Start: 2025-05-14

## 2025-05-14 NOTE — PROGRESS NOTES
{PROVIDER CHARTING PREFERENCE:295259}    Catracho Pro is a 64 year old, presenting for the following health issues:  Recheck Medication        5/14/2025    10:23 AM   Additional Questions   Roomed by Svetlana ROBRETO CMA     History of Present Illness       Reason for visit:  Labwork medication    She eats 2-3 servings of fruits and vegetables daily.She consumes 0 sweetened beverage(s) daily.She exercises with enough effort to increase her heart rate 10 to 19 minutes per day.  She exercises with enough effort to increase her heart rate 3 or less days per week.   She is taking medications regularly.        {MA/LPN/RN Pre-Provider Visit Orders- hCG/UA/Strep (Optional):763728}  {SUPERLIST (Optional):907967}  {additonal problems for provider to add (Optional):593733}    {ROS Picklists (Optional):027652}      Objective    BP (!) 147/93 (BP Location: Left arm, Patient Position: Sitting, Cuff Size: Adult Large)   Pulse 71   Temp 97.6  F (36.4  C) (Oral)   Resp 16   Wt 99.9 kg (220 lb 3.2 oz)   LMP  (LMP Unknown)   SpO2 96%   BMI 35.54 kg/m    Body mass index is 35.54 kg/m .  Physical Exam   {Exam List (Optional):634253}    {Diagnostic Test Results (Optional):348199}        Signed Electronically by: Hardeep Sharp MD  {Email feedback regarding this note to [email or web address]   :757038}   plan of care for the diagnosis(es)/condition(s) as documented were addressed during this visit. Due to the added complexity in care, I will continue to support Inocencia in the subsequent management and with ongoing continuity of care.    Spent 40 minutes including time for chart preparation and review as well as reviewing weight loss options and laboratory testing        Subjective   Inocencia is a 64 year old, presenting for the following health issues:  Recheck Medication        5/14/2025    10:23 AM   Additional Questions   Roomed by Svetlana ROBERTO CMA     History of Present Illness       Reason for visit:  Labwork medication    She eats 2-3 servings of fruits and vegetables daily.She consumes 0 sweetened beverage(s) daily.She exercises with enough effort to increase her heart rate 10 to 19 minutes per day.  She exercises with enough effort to increase her heart rate 3 or less days per week.   She is taking medications regularly.      Inocencia presents to the clinic for a medication check and for laboratory testing as well as a review of her blood pressure.  She also has concerns regarding her weight.    Medical history is notable for hypercholesterolemia, vitamin D deficiency, and hypothyroidism.  She follows up with ophthalmology given history of glaucoma.      She has followed up with Dr. Pryor, endocrinology, given hypothyroidism and is treated with Synthroid.  She would like to check her thyroid test.     Laboratory testing revealed a hemoglobin A1c of 5.6%.      Weight loss remains a problem.  She stopped metformin as this caused gastrointestinal side effects.  At 1 point she was treated with phentermine.  She did try Livea without improvement.  She strongly would like to consider a GLP-1 agonist and would like a prescription sent.                       Objective    BP (!) 147/93 (BP Location: Left arm, Patient Position: Sitting, Cuff Size: Adult Large)   Pulse 71   Temp 97.6  F (36.4  C) (Oral)   Resp 16   Wt  99.9 kg (220 lb 3.2 oz)   LMP  (LMP Unknown)   SpO2 96%   BMI 35.54 kg/m    Body mass index is 35.54 kg/m .  Physical Exam   GENERAL: alert and no distress  EYES: Eyes grossly normal to inspection, PERRL and conjunctivae and sclerae normal  RESP: lungs clear to auscultation - no rales, rhonchi or wheezes  CV: regular rate and rhythm, normal S1 S2, no S3 or S4, no murmur, click or rub, no peripheral edema  PSYCH: mentation appears normal, affect normal/bright            Signed Electronically by: Hardeep Sharp MD

## 2025-05-14 NOTE — PATIENT INSTRUCTIONS
Inocencia,    We will check your laboratory testing today as discussed  This will include laboratory testing that you need for Dr. Pryor  I sent your steroid creams to your pharmacy  Follow-up with gynecology for your Pap test as planned  Continue to work on your diet and weight loss as you are able    We discussed options for weight loss including the GLP-1 agonist  Options include Mounjaro, Wegovy, Zepbound, or Ozempic  You can check with insurance regarding coverage    I recommend that you consider a home blood pressure device.  Your goal blood pressure is less than 130/80.  One device type is Omron which you can purchase on-line or at some pharmacies    Hardeep Sharp MD

## 2025-05-15 ENCOUNTER — TELEPHONE (OUTPATIENT)
Dept: FAMILY MEDICINE | Facility: CLINIC | Age: 65
End: 2025-05-15
Payer: COMMERCIAL

## 2025-05-15 ENCOUNTER — RESULTS FOLLOW-UP (OUTPATIENT)
Dept: FAMILY MEDICINE | Facility: CLINIC | Age: 65
End: 2025-05-15

## 2025-05-15 DIAGNOSIS — R73.03 PREDIABETES: ICD-10-CM

## 2025-05-15 DIAGNOSIS — E78.00 PURE HYPERCHOLESTEROLEMIA: ICD-10-CM

## 2025-05-15 DIAGNOSIS — R79.89 ELEVATED SERUM CREATININE: ICD-10-CM

## 2025-05-15 DIAGNOSIS — E66.812 CLASS 2 SEVERE OBESITY WITH SERIOUS COMORBIDITY AND BODY MASS INDEX (BMI) OF 35.0 TO 35.9 IN ADULT, UNSPECIFIED OBESITY TYPE (H): Primary | ICD-10-CM

## 2025-05-15 DIAGNOSIS — E66.01 CLASS 2 SEVERE OBESITY WITH SERIOUS COMORBIDITY AND BODY MASS INDEX (BMI) OF 35.0 TO 35.9 IN ADULT, UNSPECIFIED OBESITY TYPE (H): Primary | ICD-10-CM

## 2025-05-15 LAB
ALBUMIN SERPL BCG-MCNC: 4.4 G/DL (ref 3.5–5.2)
ALP SERPL-CCNC: 95 U/L (ref 40–150)
ALT SERPL W P-5'-P-CCNC: 27 U/L (ref 0–50)
ANION GAP SERPL CALCULATED.3IONS-SCNC: 14 MMOL/L (ref 7–15)
AST SERPL W P-5'-P-CCNC: 30 U/L (ref 0–45)
BILIRUB SERPL-MCNC: 0.7 MG/DL
BILIRUBIN DIRECT (ROCHE PRO & PURE): 0.27 MG/DL (ref 0–0.45)
BUN SERPL-MCNC: 17.2 MG/DL (ref 8–23)
CALCIUM SERPL-MCNC: 9.3 MG/DL (ref 8.8–10.4)
CHLORIDE SERPL-SCNC: 104 MMOL/L (ref 98–107)
CHOLEST SERPL-MCNC: 246 MG/DL
CREAT SERPL-MCNC: 1.14 MG/DL (ref 0.51–0.95)
EGFRCR SERPLBLD CKD-EPI 2021: 54 ML/MIN/1.73M2
FASTING STATUS PATIENT QL REPORTED: YES
FASTING STATUS PATIENT QL REPORTED: YES
GLUCOSE SERPL-MCNC: 117 MG/DL (ref 70–99)
HCO3 SERPL-SCNC: 21 MMOL/L (ref 22–29)
HDLC SERPL-MCNC: 45 MG/DL
LDLC SERPL CALC-MCNC: 163 MG/DL
NONHDLC SERPL-MCNC: 201 MG/DL
POTASSIUM SERPL-SCNC: 4.3 MMOL/L (ref 3.4–5.3)
PROT SERPL-MCNC: 6.9 G/DL (ref 6.4–8.3)
SODIUM SERPL-SCNC: 139 MMOL/L (ref 135–145)
TRIGL SERPL-MCNC: 188 MG/DL
TSH SERPL DL<=0.005 MIU/L-ACNC: 1.83 UIU/ML (ref 0.3–4.2)
VIT D+METAB SERPL-MCNC: 53 NG/ML (ref 20–50)

## 2025-05-15 NOTE — TELEPHONE ENCOUNTER
Please notify.  I sent a prescription for Mounjaro for her.    Also, I previously sent comments regarding her labs.  I commented on the abnormal kidney testing.  This may be transient.  She can recheck her laboratory testing in 3 months.

## 2025-05-15 NOTE — TELEPHONE ENCOUNTER
Spoke with patient. She said a PA needed to be sent in for mounjaro. Routed this to team     Karla Easton RN

## 2025-05-15 NOTE — TELEPHONE ENCOUNTER
Patient calling requesting rx for Mounjaro sent to her pharmacy as discussed at recent office visit. Pharmacy pended.    She also had questions regarding recent labs - specifically her elevated creatinine. Wondering if PCP has comments or recommendations. Advised that PCP has not yet had a chance to review lab results and is out of office today but that a note would be sent regarding her requests.    Danni Montiel RN

## 2025-05-19 NOTE — TELEPHONE ENCOUNTER
Central Prior Authorization Team   Phone: [phone]    PA Initiation    Medication: tizepatide (Mounjaro) 2.5mg/0.5ml    Insurance Company: [employer] ([facility]) - Phone [phone] Fax [phone]  Pharmacy Filling the Rx: [id number] - [place], [place] - [address]  Filling Pharmacy Phone: [phone]  Filling Pharmacy Fax:    Start Date: 5/19/2025    [facility] KEY: [id number]

## 2025-05-20 ENCOUNTER — IMMUNIZATION (OUTPATIENT)
Dept: FAMILY MEDICINE | Facility: CLINIC | Age: 65
End: 2025-05-20
Payer: COMMERCIAL

## 2025-05-20 DIAGNOSIS — Z23 HIGH PRIORITY FOR 2019-NCOV VACCINE: Primary | ICD-10-CM

## 2025-05-20 PROCEDURE — 99207 PR NO CHARGE NURSE ONLY: CPT

## 2025-05-20 PROCEDURE — 90480 ADMN SARSCOV2 VAC 1/ONLY CMP: CPT

## 2025-05-20 PROCEDURE — 91320 SARSCV2 VAC 30MCG TRS-SUC IM: CPT

## 2025-05-20 NOTE — TELEPHONE ENCOUNTER
Pt stated that ozempic and wegovy are alternatives and would like to try she is only 3 points away from the covering diabetic number - she would like to appeal as well please advise on if the appeal  is okay and she wants to see if these alternatives work in the mean time of an appeal

## 2025-05-20 NOTE — TELEPHONE ENCOUNTER
PRIOR AUTHORIZATION DENIED    Medication: tizepatide (Mounjaro) 2.5mg/0.5ml      Denial Date: 5/20/2025    Denial Rational:   Why was my request denied?  This request was denied because you did not meet the following requirements:  Based on the information provided, you do not meet the established medication-specific criteria or  guidelines for Mounjaro at this time.  The request for coverage for MOUNJARO INJ 2.5/0.5, use as directed (2 per month), is denied. This  decision is based on health plan criteria for MOUNJARO INJ 2.5/0.5. This medicine is covered only if:  One of the following:  (1) Your doctor submits medical records (for example, chart notes) confirming diagnosis of type 2  diabetes mellitus as evidenced by one of the following laboratory values:  (A) A1C greater than or equal to 6.5%.  (B) Fasting plasma glucose greater than or equal to 126mg/dL.  (C) 2-hour plasma glucose greater than or equal to 200mg/dL during oral glucose tolerance test.  (D) Random plasma glucose greater than or equal to 200mg/dL with classic symptoms of  hyperglycemia or hyperglycemic crisis.  (2) If you require ongoing treatment for type 2 diabetes mellitus (that is, diagnosed greater than 2 years  ago), your doctor submits medical records (for example, chart notes) confirming diagnosis of type 2  diabetes mellitus.  The information provided does not show that you meet the criteria listed above.  Reviewed by: Tanner

## 2025-05-20 NOTE — TELEPHONE ENCOUNTER
This all depends on her insurance.  She needs to figure out if anything else is covered.  If not covered then that medication will not be an option.  She does have the option of following up again with Dr. Bailey to review.

## 2025-05-20 NOTE — TELEPHONE ENCOUNTER
Pt calls in. Relayed message. She is wondering what PCP recommends as next steps.     Writer encouraged pt to call insurance and ask what the alternatives would be. She will let us know.

## 2025-05-21 NOTE — TELEPHONE ENCOUNTER
Called and spoke with patient, relayed information about PA denial. Patient stated understanding and may try working with [facility] weight loss centers. She had no further questions or concerns.    Danni Montiel RN

## 2025-05-21 NOTE — TELEPHONE ENCOUNTER
Please call back to clarify. The PA has already been denied. She does not have diabetes. There is no point to sending an alternative if her insurance does not covera GLP-1 agonist for prediabetes or other conditions. I will only send a prescription if she checks with insurance and they tell her that one is covered.     With that in mind I recommend that she follow-up with the bariatric/weight loss team to review options.

## 2025-07-19 ENCOUNTER — HEALTH MAINTENANCE LETTER (OUTPATIENT)
Age: 65
End: 2025-07-19